# Patient Record
Sex: FEMALE | Race: WHITE | NOT HISPANIC OR LATINO | Employment: OTHER | URBAN - NONMETROPOLITAN AREA
[De-identification: names, ages, dates, MRNs, and addresses within clinical notes are randomized per-mention and may not be internally consistent; named-entity substitution may affect disease eponyms.]

---

## 2022-05-29 DIAGNOSIS — F51.01 PRIMARY INSOMNIA: Primary | ICD-10-CM

## 2022-05-31 RX ORDER — TRAZODONE HYDROCHLORIDE 50 MG/1
TABLET ORAL
Qty: 90 TABLET | Refills: 0 | Status: SHIPPED | OUTPATIENT
Start: 2022-05-31 | End: 2022-08-10 | Stop reason: SDUPTHER

## 2022-07-25 RX ORDER — AMLODIPINE BESYLATE 5 MG/1
TABLET ORAL
Qty: 90 TABLET | Refills: 0 | Status: SHIPPED | OUTPATIENT
Start: 2022-07-25 | End: 2022-08-10 | Stop reason: SDUPTHER

## 2022-07-25 NOTE — TELEPHONE ENCOUNTER
Rx Refill Note    Requested Prescriptions     Pending Prescriptions Disp Refills   • amLODIPine (NORVASC) 5 MG tablet [Pharmacy Med Name: amLODIPine BESYLATE 5 MG TAB] 90 tablet 0     Sig: TAKE ONE TABLET BY MOUTH DAILY        Last office visit with prescribing clinician: Via [facility] 12/07/2021      Next office visit with prescribing clinician: 8/10/2022   Last labs:   Last refill: 12/07/2021   Pharmacy (be sure to add in Epic). correct

## 2022-07-28 ENCOUNTER — LAB (OUTPATIENT)
Dept: FAMILY MEDICINE CLINIC | Facility: CLINIC | Age: 62
End: 2022-07-28

## 2022-07-28 DIAGNOSIS — Z79.899 ENCOUNTER FOR LONG-TERM (CURRENT) USE OF OTHER MEDICATIONS: Primary | ICD-10-CM

## 2022-07-28 PROCEDURE — 36415 COLL VENOUS BLD VENIPUNCTURE: CPT | Performed by: FAMILY MEDICINE

## 2022-07-29 LAB
BASOPHILS # BLD AUTO: 0 X10E3/UL (ref 0–0.2)
BASOPHILS NFR BLD AUTO: 1 %
CHOLEST SERPL-MCNC: 192 MG/DL (ref 100–199)
CK SERPL-CCNC: 76 U/L (ref 32–182)
EOSINOPHIL # BLD AUTO: 0.1 X10E3/UL (ref 0–0.4)
EOSINOPHIL NFR BLD AUTO: 3 %
ERYTHROCYTE [DISTWIDTH] IN BLOOD BY AUTOMATED COUNT: 12.7 % (ref 11.7–15.4)
HBA1C MFR BLD: 6.3 % (ref 4.8–5.6)
HCT VFR BLD AUTO: 41.7 % (ref 34–46.6)
HDLC SERPL-MCNC: 58 MG/DL
HGB BLD-MCNC: 13.3 G/DL (ref 11.1–15.9)
IMM GRANULOCYTES # BLD AUTO: 0 X10E3/UL (ref 0–0.1)
IMM GRANULOCYTES NFR BLD AUTO: 0 %
LDLC SERPL CALC-MCNC: 106 MG/DL (ref 0–99)
LYMPHOCYTES # BLD AUTO: 2.3 X10E3/UL (ref 0.7–3.1)
LYMPHOCYTES NFR BLD AUTO: 44 %
MCH RBC QN AUTO: 28 PG (ref 26.6–33)
MCHC RBC AUTO-ENTMCNC: 31.9 G/DL (ref 31.5–35.7)
MCV RBC AUTO: 88 FL (ref 79–97)
MONOCYTES # BLD AUTO: 0.4 X10E3/UL (ref 0.1–0.9)
MONOCYTES NFR BLD AUTO: 8 %
NEUTROPHILS # BLD AUTO: 2.3 X10E3/UL (ref 1.4–7)
NEUTROPHILS NFR BLD AUTO: 44 %
PLATELET # BLD AUTO: 239 X10E3/UL (ref 150–450)
RBC # BLD AUTO: 4.75 X10E6/UL (ref 3.77–5.28)
TRIGL SERPL-MCNC: 160 MG/DL (ref 0–149)
TSH SERPL DL<=0.005 MIU/L-ACNC: 4.74 UIU/ML (ref 0.45–4.5)
VLDLC SERPL CALC-MCNC: 28 MG/DL (ref 5–40)
WBC # BLD AUTO: 5.2 X10E3/UL (ref 3.4–10.8)

## 2022-08-10 ENCOUNTER — OFFICE VISIT (OUTPATIENT)
Dept: FAMILY MEDICINE CLINIC | Facility: CLINIC | Age: 62
End: 2022-08-10

## 2022-08-10 VITALS
HEART RATE: 76 BPM | OXYGEN SATURATION: 99 % | WEIGHT: 147.9 LBS | BODY MASS INDEX: 27.22 KG/M2 | TEMPERATURE: 97.6 F | HEIGHT: 62 IN | DIASTOLIC BLOOD PRESSURE: 80 MMHG | SYSTOLIC BLOOD PRESSURE: 120 MMHG | RESPIRATION RATE: 14 BRPM

## 2022-08-10 DIAGNOSIS — F51.01 PRIMARY INSOMNIA: ICD-10-CM

## 2022-08-10 DIAGNOSIS — E55.9 VITAMIN D DEFICIENCY: ICD-10-CM

## 2022-08-10 DIAGNOSIS — R73.9 HYPERGLYCEMIA: ICD-10-CM

## 2022-08-10 DIAGNOSIS — E78.2 HYPERLIPIDEMIA, MIXED: ICD-10-CM

## 2022-08-10 DIAGNOSIS — F41.9 ANXIETY: ICD-10-CM

## 2022-08-10 DIAGNOSIS — E03.9 ACQUIRED HYPOTHYROIDISM: ICD-10-CM

## 2022-08-10 DIAGNOSIS — E83.52 HYPERCALCEMIA: ICD-10-CM

## 2022-08-10 DIAGNOSIS — I10 HYPERTENSION, ESSENTIAL: ICD-10-CM

## 2022-08-10 DIAGNOSIS — Z00.00 PHYSICAL EXAM: Primary | ICD-10-CM

## 2022-08-10 DIAGNOSIS — Z13.9 SCREENING DUE: ICD-10-CM

## 2022-08-10 PROCEDURE — 99396 PREV VISIT EST AGE 40-64: CPT | Performed by: FAMILY MEDICINE

## 2022-08-10 RX ORDER — AMLODIPINE BESYLATE 5 MG/1
5 TABLET ORAL DAILY
Qty: 90 TABLET | Refills: 1 | Status: SHIPPED | OUTPATIENT
Start: 2022-08-10 | End: 2023-03-01 | Stop reason: SDUPTHER

## 2022-08-10 RX ORDER — LORAZEPAM 1 MG/1
1 TABLET ORAL EVERY 8 HOURS PRN
COMMUNITY
End: 2022-08-10 | Stop reason: SDUPTHER

## 2022-08-10 RX ORDER — OMEGA-3S/DHA/EPA/FISH OIL/D3 300MG-1000
400 CAPSULE ORAL DAILY
COMMUNITY
End: 2022-08-10 | Stop reason: SDUPTHER

## 2022-08-10 RX ORDER — OMEGA-3S/DHA/EPA/FISH OIL/D3 300MG-1000
400 CAPSULE ORAL DAILY
Qty: 90 TABLET | Refills: 1 | Status: SHIPPED | OUTPATIENT
Start: 2022-08-10 | End: 2023-02-20

## 2022-08-10 RX ORDER — SIMVASTATIN 20 MG
TABLET ORAL
COMMUNITY
Start: 2022-06-02 | End: 2022-08-10 | Stop reason: SDUPTHER

## 2022-08-10 RX ORDER — LORAZEPAM 1 MG/1
1 TABLET ORAL NIGHTLY
Qty: 30 TABLET | Refills: 2 | Status: SHIPPED | OUTPATIENT
Start: 2022-08-10 | End: 2023-03-01 | Stop reason: SDUPTHER

## 2022-08-10 RX ORDER — TRAZODONE HYDROCHLORIDE 50 MG/1
50 TABLET ORAL
Qty: 90 TABLET | Refills: 1 | Status: SHIPPED | OUTPATIENT
Start: 2022-08-10 | End: 2023-02-20

## 2022-08-10 RX ORDER — SIMVASTATIN 20 MG
20 TABLET ORAL NIGHTLY
Qty: 90 TABLET | Refills: 1 | Status: SHIPPED | OUTPATIENT
Start: 2022-08-10 | End: 2023-03-01 | Stop reason: SDUPTHER

## 2022-08-10 NOTE — PROGRESS NOTES
Patient Name: Julie B Sipple  : 1960   MRN: [id number]     Chief Complaint:    Chief Complaint   Patient presents with   • Annual Exam     Pt here for yearly exam   • Hypertension       History of Present Illness: Julie B Sipple is a 62 y.o. female who is here today for their annual health maintenance and physical.          Review of Systems:   Review of Systems   Constitutional: Negative.    HENT: Negative.    Eyes: Negative.    Respiratory: Negative.    Cardiovascular: Negative.    Gastrointestinal: Negative.    Neurological: Negative.        Past Medical History, Social History, Family History and Care Team were all reviewed with patient and updated as appropriate.     Medications:     Current Outpatient Medications:   •  amLODIPine (NORVASC) 5 MG tablet, Take 1 tablet by mouth Daily., Disp: 90 tablet, Rfl: 1  •  cholecalciferol (VITAMIN D3) 10 MCG (400 UNIT) tablet, Take 1 tablet by mouth Daily., Disp: 90 tablet, Rfl: 1  •  LORazepam (ATIVAN) 1 MG tablet, Take 1 tablet by mouth Every Night., Disp: 30 tablet, Rfl: 2  •  simvastatin (ZOCOR) 20 MG tablet, Take 1 tablet by mouth Every Night., Disp: 90 tablet, Rfl: 1  •  traZODone (DESYREL) 50 MG tablet, Take 1 tablet by mouth every night at bedtime., Disp: 90 tablet, Rfl: 1    Allergies:   No Known Allergies    Health Maintenance   Topic Date Due   • COLORECTAL CANCER SCREENING  Never done   • TDAP/TD VACCINES (2 - Td or Tdap) 10/12/2020   • HEPATITIS C SCREENING  Never done   • PAP SMEAR  Never done   • INFLUENZA VACCINE  10/01/2022   • LIPID PANEL  2023   • ANNUAL PHYSICAL  2023   • MAMMOGRAM  2024   • COVID-19 Vaccine  Completed   • ZOSTER VACCINE  Completed   • Pneumococcal Vaccine 0-64  Aged Out        PHQ-2 Total Score: 0       Intimate partner violence: (Screen on initial visit, pregnant women, women with injuries, older adult with injury or evidence of neglect):  • Violence can be a problem in many people's lives, so I now ask  "every patient about trauma or abuse they may have experienced in a relationship.  • Stress/Safety - Do you feel safe in your relationship?  • Afraid/Abused - Have you ever been in a relationship where you were threatened, hurt, or afraid?  • Friend/Family - Are your friends aware you have been hurt?  • Emergency Plan - Do you have a safe place to go and the resources you need in an emergency?    Osteoporosis:   • Ost menopausal women < 65 with RF (advancing age, previous fracture, glucocorticoid therapy, parental hip fracture, low body weight, current cigarette smoking, excessive alcohol consumption, rheumatoid arthritis, secondary osteoporosis [hypogonadism/premature menopause, malabsorption, chronic liver disease, IBD]).  • All women 65 or older      Physical Exam:  Vital Signs:   Vitals:    08/10/22 1028   BP: 120/80   BP Location: Left arm   Patient Position: Sitting   Cuff Size: Adult   Pulse: 76   Resp: 14   Temp: 97.6 °F (36.4 °C)   TempSrc: Temporal   SpO2: 99%   Weight: 67.1 kg (147 lb 14.4 oz)   Height: 157.5 cm (62\")   PainSc: 0-No pain     Body mass index is 27.05 kg/m².     Physical Exam  Vitals and nursing note reviewed.   Constitutional:       Appearance: Normal appearance. She is normal weight.   HENT:      Head: Normocephalic and atraumatic.      Right Ear: Tympanic membrane, ear canal and external ear normal.      Left Ear: Tympanic membrane, ear canal and external ear normal.      Nose: Nose normal.      Mouth/Throat:      Mouth: Mucous membranes are moist.      Pharynx: Oropharynx is clear.   Eyes:      Extraocular Movements: Extraocular movements intact.      Conjunctiva/sclera: Conjunctivae normal.      Pupils: Pupils are equal, round, and reactive to light.   Cardiovascular:      Rate and Rhythm: Normal rate and regular rhythm.      Pulses: Normal pulses.      Heart sounds: Normal heart sounds.   Pulmonary:      Effort: Pulmonary effort is normal.      Breath sounds: Normal breath sounds. "   Abdominal:      General: Abdomen is flat. Bowel sounds are normal.      Palpations: Abdomen is soft.   Musculoskeletal:         General: Normal range of motion.      Cervical back: Normal range of motion and neck supple.   Feet:      Comments:      Skin:     General: Skin is warm and dry.   Neurological:      General: No focal deficit present.      Mental Status: She is alert and oriented to person, place, and time.   Psychiatric:         Mood and Affect: Mood normal.         Behavior: Behavior normal.         Thought Content: Thought content normal.         Judgment: Judgment normal.         Procedures      Assessment/Plan:   Diagnoses and all orders for this visit:    1. Physical exam (Primary)  Assessment & Plan:  Discussed immunizations with patient.    Orders:  -     Hemoglobin A1c; Future  -     Vitamin B12; Future  -     Vitamin D 25 Hydroxy; Future  -     Lipid Panel; Future  -     Comprehensive Metabolic Panel; Future  -     TSH Rfx On Abnormal To Free T4; Future  -     CBC & Differential; Future    2. Hyperglycemia  Assessment & Plan:  A1c is 6.3.    Orders:  -     Hemoglobin A1c; Future  -     Vitamin B12; Future  -     Vitamin D 25 Hydroxy; Future  -     Lipid Panel; Future  -     Comprehensive Metabolic Panel; Future  -     TSH Rfx On Abnormal To Free T4; Future  -     CBC & Differential; Future    3. Hypercalcemia  Assessment & Plan:  Patient sees endocrinologist.  We will follow.    Orders:  -     Hemoglobin A1c; Future  -     Vitamin B12; Future  -     Vitamin D 25 Hydroxy; Future  -     Lipid Panel; Future  -     Comprehensive Metabolic Panel; Future  -     TSH Rfx On Abnormal To Free T4; Future  -     CBC & Differential; Future    4. Hypertension, essential  Assessment & Plan:  Discussed with patient to monitor their blood pressure and if systolic blood pressure goes above 140 or diastolic is above 90 to return to clinic.  Take medicines as directed, call for any problems, patient not having or  any worrisome symptoms.        Orders:  -     amLODIPine (NORVASC) 5 MG tablet; Take 1 tablet by mouth Daily.  Dispense: 90 tablet; Refill: 1  -     Hemoglobin A1c; Future  -     Vitamin B12; Future  -     Vitamin D 25 Hydroxy; Future  -     Lipid Panel; Future  -     Comprehensive Metabolic Panel; Future  -     TSH Rfx On Abnormal To Free T4; Future  -     CBC & Differential; Future    5. Hyperlipidemia, mixed  Assessment & Plan:  HDL 58.  .  Triglycerides 160.  We will follow.    Orders:  -     simvastatin (ZOCOR) 20 MG tablet; Take 1 tablet by mouth Every Night.  Dispense: 90 tablet; Refill: 1  -     Hemoglobin A1c; Future  -     Vitamin B12; Future  -     Vitamin D 25 Hydroxy; Future  -     Lipid Panel; Future  -     Comprehensive Metabolic Panel; Future  -     TSH Rfx On Abnormal To Free T4; Future  -     CBC & Differential; Future    6. Acquired hypothyroidism  Assessment & Plan:  TSH is 4.7.  The patient about this she has no symptoms whatsoever.  Recheck in to recheck this in 6 months.  I told her if she starts getting rundown tired, I can we may start a low-dose thyroid replacement.    Orders:  -     Hemoglobin A1c; Future  -     Vitamin B12; Future  -     Vitamin D 25 Hydroxy; Future  -     Lipid Panel; Future  -     Comprehensive Metabolic Panel; Future  -     TSH Rfx On Abnormal To Free T4; Future  -     CBC & Differential; Future    7. Screening due  -     Hepatitis C Antibody; Future  -     Hemoglobin A1c; Future  -     Vitamin B12; Future  -     Vitamin D 25 Hydroxy; Future  -     Lipid Panel; Future  -     Comprehensive Metabolic Panel; Future  -     TSH Rfx On Abnormal To Free T4; Future  -     CBC & Differential; Future    8. Primary insomnia  -     traZODone (DESYREL) 50 MG tablet; Take 1 tablet by mouth every night at bedtime.  Dispense: 90 tablet; Refill: 1  -     LORazepam (ATIVAN) 1 MG tablet; Take 1 tablet by mouth Every Night.  Dispense: 30 tablet; Refill: 2  -     Hemoglobin A1c;  Future  -     Vitamin B12; Future  -     Vitamin D 25 Hydroxy; Future  -     Lipid Panel; Future  -     Comprehensive Metabolic Panel; Future  -     TSH Rfx On Abnormal To Free T4; Future  -     CBC & Differential; Future    9. Vitamin D deficiency  -     cholecalciferol (VITAMIN D3) 10 MCG (400 UNIT) tablet; Take 1 tablet by mouth Daily.  Dispense: 90 tablet; Refill: 1  -     Hemoglobin A1c; Future  -     Vitamin B12; Future  -     Vitamin D 25 Hydroxy; Future  -     Lipid Panel; Future  -     Comprehensive Metabolic Panel; Future  -     TSH Rfx On Abnormal To Free T4; Future  -     CBC & Differential; Future    10. Anxiety  Assessment & Plan:  Fill Ativan.  House bill 1.    Orders:  -     LORazepam (ATIVAN) 1 MG tablet; Take 1 tablet by mouth Every Night.  Dispense: 30 tablet; Refill: 2  -     Hemoglobin A1c; Future  -     Vitamin B12; Future  -     Vitamin D 25 Hydroxy; Future  -     Lipid Panel; Future  -     Comprehensive Metabolic Panel; Future  -     TSH Rfx On Abnormal To Free T4; Future  -     CBC & Differential; Future           Follow Up:   Return in about 6 months (around 2/10/2023) for Bloodwork 1 week prior to next appointment.    Healthcare Maintenance:   Counseling provided on diet and exercise  Julie B Sipple voices understanding and acceptance of this advice and will call back with any further questions or concerns. AVS with preventive healthcare tips printed for patient.     Janak Leiva MD  [facility]  Answers for HPI/ROS submitted by the patient on 8/3/2022  What is the primary reason for your visit?: Physical

## 2022-08-10 NOTE — ASSESSMENT & PLAN NOTE
TSH is 4.7.  The patient about this she has no symptoms whatsoever.  Recheck in to recheck this in 6 months.  I told her if she starts getting rundown tired, I can we may start a low-dose thyroid replacement.

## 2023-02-18 DIAGNOSIS — E55.9 VITAMIN D DEFICIENCY: ICD-10-CM

## 2023-02-20 DIAGNOSIS — F51.01 PRIMARY INSOMNIA: ICD-10-CM

## 2023-02-20 RX ORDER — OMEGA-3S/DHA/EPA/FISH OIL/D3 300MG-1000
CAPSULE ORAL
Qty: 90 TABLET | Refills: 1 | Status: SHIPPED | OUTPATIENT
Start: 2023-02-20 | End: 2023-03-01 | Stop reason: SDUPTHER

## 2023-02-20 RX ORDER — TRAZODONE HYDROCHLORIDE 50 MG/1
TABLET ORAL
Qty: 90 TABLET | Refills: 1 | Status: SHIPPED | OUTPATIENT
Start: 2023-02-20 | End: 2023-03-01 | Stop reason: SDUPTHER

## 2023-02-20 NOTE — TELEPHONE ENCOUNTER
Rx Refill Note    Requested Prescriptions     Pending Prescriptions Disp Refills   • traZODone (DESYREL) 50 MG tablet [Pharmacy Med Name: traZODone 50 MG TABLET] 90 tablet 1     Sig: TAKE ONE TABLET BY MOUTH EVERY NIGHT AT BEDTIME        Last office visit with prescribing clinician: 8/10/2022      Next office visit with prescribing clinician: Visit date not found   Last labs:   Last refill:    Pharmacy [facility]

## 2023-02-20 NOTE — TELEPHONE ENCOUNTER
Rx Refill Note    Requested Prescriptions     Pending Prescriptions Disp Refills   • cholecalciferol (VITAMIN D3) 10 MCG (400 UNIT) tablet [Pharmacy Med Name: VITAMIN D3 (CHOLECAL) 400 UNIT TAB] 90 tablet 1     Sig: TAKE ONE TABLET BY MOUTH DAILY        Last office visit with prescribing clinician: 8/10/2022      Next office visit with prescribing clinician: 2/20/2023   Last labs:   Last refill:    Pharmacy [facility]

## 2023-02-24 ENCOUNTER — LAB (OUTPATIENT)
Dept: FAMILY MEDICINE CLINIC | Facility: CLINIC | Age: 63
End: 2023-02-24
Payer: MEDICARE

## 2023-02-24 DIAGNOSIS — E83.52 HYPERCALCEMIA: ICD-10-CM

## 2023-02-24 DIAGNOSIS — E78.2 HYPERLIPIDEMIA, MIXED: ICD-10-CM

## 2023-02-24 DIAGNOSIS — F41.9 ANXIETY: ICD-10-CM

## 2023-02-24 DIAGNOSIS — I10 HYPERTENSION, ESSENTIAL: ICD-10-CM

## 2023-02-24 DIAGNOSIS — F51.01 PRIMARY INSOMNIA: ICD-10-CM

## 2023-02-24 DIAGNOSIS — E55.9 VITAMIN D DEFICIENCY: ICD-10-CM

## 2023-02-24 DIAGNOSIS — Z13.9 SCREENING DUE: ICD-10-CM

## 2023-02-24 DIAGNOSIS — R73.9 HYPERGLYCEMIA: ICD-10-CM

## 2023-02-24 DIAGNOSIS — E03.9 ACQUIRED HYPOTHYROIDISM: ICD-10-CM

## 2023-02-24 DIAGNOSIS — Z00.00 PHYSICAL EXAM: ICD-10-CM

## 2023-02-24 PROCEDURE — 36415 COLL VENOUS BLD VENIPUNCTURE: CPT | Performed by: FAMILY MEDICINE

## 2023-02-25 LAB
ALBUMIN SERPL-MCNC: 4.6 G/DL (ref 3.8–4.8)
ALBUMIN/GLOB SERPL: 2.6 {RATIO} (ref 1.2–2.2)
ALP SERPL-CCNC: 92 IU/L (ref 44–121)
ALT SERPL-CCNC: 34 IU/L (ref 0–32)
AST SERPL-CCNC: 30 IU/L (ref 0–40)
BASOPHILS # BLD AUTO: 0 X10E3/UL (ref 0–0.2)
BASOPHILS NFR BLD AUTO: 1 %
BILIRUB SERPL-MCNC: 0.5 MG/DL (ref 0–1.2)
BUN SERPL-MCNC: 15 MG/DL (ref 8–27)
BUN/CREAT SERPL: 21 (ref 12–28)
CALCIUM SERPL-MCNC: 10.3 MG/DL (ref 8.7–10.3)
CHLORIDE SERPL-SCNC: 104 MMOL/L (ref 96–106)
CHOLEST SERPL-MCNC: 169 MG/DL (ref 100–199)
CO2 SERPL-SCNC: 23 MMOL/L (ref 20–29)
CREAT SERPL-MCNC: 0.7 MG/DL (ref 0.57–1)
EGFRCR SERPLBLD CKD-EPI 2021: 96 ML/MIN/1.73
EOSINOPHIL # BLD AUTO: 0.2 X10E3/UL (ref 0–0.4)
EOSINOPHIL NFR BLD AUTO: 3 %
ERYTHROCYTE [DISTWIDTH] IN BLOOD BY AUTOMATED COUNT: 12.5 % (ref 11.7–15.4)
GLOBULIN SER CALC-MCNC: 1.8 G/DL (ref 1.5–4.5)
GLUCOSE SERPL-MCNC: 109 MG/DL (ref 70–99)
HBA1C MFR BLD: 6.2 % (ref 4.8–5.6)
HCT VFR BLD AUTO: 39.9 % (ref 34–46.6)
HDLC SERPL-MCNC: 58 MG/DL
HGB BLD-MCNC: 13.2 G/DL (ref 11.1–15.9)
IMM GRANULOCYTES # BLD AUTO: 0 X10E3/UL (ref 0–0.1)
IMM GRANULOCYTES NFR BLD AUTO: 0 %
LDLC SERPL CALC-MCNC: 91 MG/DL (ref 0–99)
LYMPHOCYTES # BLD AUTO: 2.4 X10E3/UL (ref 0.7–3.1)
LYMPHOCYTES NFR BLD AUTO: 41 %
MCH RBC QN AUTO: 28.4 PG (ref 26.6–33)
MCHC RBC AUTO-ENTMCNC: 33.1 G/DL (ref 31.5–35.7)
MCV RBC AUTO: 86 FL (ref 79–97)
MONOCYTES # BLD AUTO: 0.4 X10E3/UL (ref 0.1–0.9)
MONOCYTES NFR BLD AUTO: 7 %
NEUTROPHILS # BLD AUTO: 2.8 X10E3/UL (ref 1.4–7)
NEUTROPHILS NFR BLD AUTO: 48 %
PLATELET # BLD AUTO: 236 X10E3/UL (ref 150–450)
POTASSIUM SERPL-SCNC: 3.9 MMOL/L (ref 3.5–5.2)
PROT SERPL-MCNC: 6.4 G/DL (ref 6–8.5)
RBC # BLD AUTO: 4.64 X10E6/UL (ref 3.77–5.28)
SODIUM SERPL-SCNC: 141 MMOL/L (ref 134–144)
TRIGL SERPL-MCNC: 115 MG/DL (ref 0–149)
VLDLC SERPL CALC-MCNC: 20 MG/DL (ref 5–40)
WBC # BLD AUTO: 5.7 X10E3/UL (ref 3.4–10.8)

## 2023-03-01 ENCOUNTER — OFFICE VISIT (OUTPATIENT)
Dept: FAMILY MEDICINE CLINIC | Facility: CLINIC | Age: 63
End: 2023-03-01
Payer: MEDICARE

## 2023-03-01 VITALS
RESPIRATION RATE: 15 BRPM | TEMPERATURE: 98 F | BODY MASS INDEX: 26.78 KG/M2 | HEIGHT: 62 IN | WEIGHT: 145.5 LBS | SYSTOLIC BLOOD PRESSURE: 130 MMHG | OXYGEN SATURATION: 99 % | HEART RATE: 77 BPM | DIASTOLIC BLOOD PRESSURE: 82 MMHG

## 2023-03-01 DIAGNOSIS — R73.9 HYPERGLYCEMIA: ICD-10-CM

## 2023-03-01 DIAGNOSIS — E83.52 HYPERCALCEMIA: ICD-10-CM

## 2023-03-01 DIAGNOSIS — Z79.899 ENCOUNTER FOR LONG-TERM (CURRENT) USE OF OTHER MEDICATIONS: Primary | ICD-10-CM

## 2023-03-01 DIAGNOSIS — E78.2 HYPERLIPIDEMIA, MIXED: ICD-10-CM

## 2023-03-01 DIAGNOSIS — E03.9 ACQUIRED HYPOTHYROIDISM: ICD-10-CM

## 2023-03-01 DIAGNOSIS — F41.9 ANXIETY: ICD-10-CM

## 2023-03-01 DIAGNOSIS — I10 HYPERTENSION, ESSENTIAL: ICD-10-CM

## 2023-03-01 DIAGNOSIS — F51.01 PRIMARY INSOMNIA: ICD-10-CM

## 2023-03-01 DIAGNOSIS — E55.9 VITAMIN D DEFICIENCY: ICD-10-CM

## 2023-03-01 PROBLEM — R73.03 PREDIABETES: Status: ACTIVE | Noted: 2017-05-15

## 2023-03-01 PROBLEM — F40.243 FEAR OF FLYING: Status: ACTIVE | Noted: 2017-05-15

## 2023-03-01 LAB
25(OH)D3+25(OH)D2 SERPL-MCNC: 58.6 NG/ML (ref 30–100)
HCV IGG SERPL QL IA: NON REACTIVE
POC AMPHETAMINES: NEGATIVE
POC BARBITURATES: NEGATIVE
POC BENZODIAZEPHINES: POSITIVE
POC COCAINE: NEGATIVE
POC METHADONE: NEGATIVE
POC METHAMPHETAMINE SCREEN URINE: NEGATIVE
POC OPIATES: NEGATIVE
POC OXYCODONE: NEGATIVE
POC PHENCYCLIDINE: NEGATIVE
POC PROPOXYPHENE: NEGATIVE
POC THC: NEGATIVE
POC TRICYCLIC ANTIDEPRESSANTS: NEGATIVE
TSH SERPL DL<=0.005 MIU/L-ACNC: 2.68 UIU/ML (ref 0.45–4.5)

## 2023-03-01 PROCEDURE — 36415 COLL VENOUS BLD VENIPUNCTURE: CPT | Performed by: FAMILY MEDICINE

## 2023-03-01 PROCEDURE — 90677 PCV20 VACCINE IM: CPT | Performed by: FAMILY MEDICINE

## 2023-03-01 PROCEDURE — 80305 DRUG TEST PRSMV DIR OPT OBS: CPT | Performed by: FAMILY MEDICINE

## 2023-03-01 PROCEDURE — 90715 TDAP VACCINE 7 YRS/> IM: CPT | Performed by: FAMILY MEDICINE

## 2023-03-01 PROCEDURE — 90471 IMMUNIZATION ADMIN: CPT | Performed by: FAMILY MEDICINE

## 2023-03-01 PROCEDURE — G0009 ADMIN PNEUMOCOCCAL VACCINE: HCPCS | Performed by: FAMILY MEDICINE

## 2023-03-01 PROCEDURE — 99214 OFFICE O/P EST MOD 30 MIN: CPT | Performed by: FAMILY MEDICINE

## 2023-03-01 RX ORDER — OMEGA-3S/DHA/EPA/FISH OIL/D3 300MG-1000
400 CAPSULE ORAL DAILY
Qty: 90 TABLET | Refills: 1 | Status: SHIPPED | OUTPATIENT
Start: 2023-03-01

## 2023-03-01 RX ORDER — LORAZEPAM 1 MG/1
1 TABLET ORAL NIGHTLY
Qty: 30 TABLET | Refills: 2 | Status: SHIPPED | OUTPATIENT
Start: 2023-03-01

## 2023-03-01 RX ORDER — TRAZODONE HYDROCHLORIDE 50 MG/1
50 TABLET ORAL
Qty: 90 TABLET | Refills: 1 | Status: SHIPPED | OUTPATIENT
Start: 2023-03-01

## 2023-03-01 RX ORDER — SIMVASTATIN 20 MG
20 TABLET ORAL NIGHTLY
Qty: 90 TABLET | Refills: 1 | Status: SHIPPED | OUTPATIENT
Start: 2023-03-01

## 2023-03-01 RX ORDER — AMLODIPINE BESYLATE 5 MG/1
5 TABLET ORAL DAILY
Qty: 90 TABLET | Refills: 1 | Status: SHIPPED | OUTPATIENT
Start: 2023-03-01

## 2023-03-01 NOTE — PROGRESS NOTES
Patient Name: Julie B Sipple  : 1960   MRN: [id number]     Chief Complaint:    Chief Complaint   Patient presents with   • Med Refill     Patient is here for a meds refills        History of Present Illness: Julie B Sipple is a 63 y.o. female who is here today for follow up.  HPI        Review of Systems:   Review of Systems   Constitutional: Negative.    HENT: Negative.    Eyes: Negative.    Respiratory: Negative.    Cardiovascular: Negative.    Gastrointestinal: Negative.    Neurological: Negative.         Past Medical History:   Past Medical History:   Diagnosis Date   • Anxiety    • Fear of flying    • Mixed hyperlipidemia    • Prediabetes        Past Surgical History:   Past Surgical History:   Procedure Laterality Date   • TUBAL ABDOMINAL LIGATION Bilateral        Family History:   Family History   Problem Relation Age of Onset   • Hypertension Mother    • Breast cancer Mother 42   • Hypertension Sister    • Stroke Maternal Grandmother 65   • Other Daughter         BLADDER DISEASE       Social History:   Social History     Socioeconomic History   • Marital status:    Tobacco Use   • Smoking status: Former     Packs/day: 1.00     Years: 4.00     Pack years: 4.00     Types: Cigarettes     Start date:      Quit date:      Years since quittin.1   • Smokeless tobacco: Never   Substance and Sexual Activity   • Alcohol use: Never   • Drug use: Never   • Sexual activity: Defer       Medications:     Current Outpatient Medications:   •  amLODIPine (NORVASC) 5 MG tablet, Take 1 tablet by mouth Daily., Disp: 90 tablet, Rfl: 1  •  cholecalciferol (VITAMIN D3) 10 MCG (400 UNIT) tablet, Take 1 tablet by mouth Daily., Disp: 90 tablet, Rfl: 1  •  LORazepam (ATIVAN) 1 MG tablet, Take 1 tablet by mouth Every Night., Disp: 30 tablet, Rfl: 2  •  simvastatin (ZOCOR) 20 MG tablet, Take 1 tablet by mouth Every Night., Disp: 90 tablet, Rfl: 1  •  traZODone (DESYREL) 50 MG tablet, Take 1 tablet by  "mouth every night at bedtime., Disp: 90 tablet, Rfl: 1    Allergies:   No Known Allergies      Physical Exam:  Vital Signs:   Vitals:    03/01/23 0922   BP: 130/82   BP Location: Right arm   Patient Position: Sitting   Cuff Size: Adult   Pulse: 77   Resp: 15   Temp: 98 °F (36.7 °C)   TempSrc: Temporal   SpO2: 99%   Weight: 66 kg (145 lb 8 oz)   Height: 157.5 cm (62\")     Body mass index is 26.61 kg/m².     Physical Exam  Vitals and nursing note reviewed.   Constitutional:       Appearance: Normal appearance. She is normal weight.   HENT:      Head: Normocephalic and atraumatic.      Right Ear: Tympanic membrane, ear canal and external ear normal.      Left Ear: Tympanic membrane, ear canal and external ear normal.      Nose: Nose normal.      Mouth/Throat:      Mouth: Mucous membranes are dry.      Pharynx: Oropharynx is clear.   Eyes:      Extraocular Movements: Extraocular movements intact.      Conjunctiva/sclera: Conjunctivae normal.      Pupils: Pupils are equal, round, and reactive to light.   Cardiovascular:      Rate and Rhythm: Normal rate and regular rhythm.      Pulses: Normal pulses.      Heart sounds: Normal heart sounds.   Pulmonary:      Effort: Pulmonary effort is normal.      Breath sounds: Normal breath sounds.   Musculoskeletal:      Cervical back: Normal range of motion and neck supple.   Feet:      Comments:      Neurological:      Mental Status: She is alert.         Procedures      Assessment/Plan:   Diagnoses and all orders for this visit:    1. Encounter for long-term (current) use of other medications (Primary)  -     POC Urine Drug Screen, Triage  -     Hemoglobin A1c; Future  -     Vitamin D,25-Hydroxy; Future  -     Lipid Panel; Future  -     Comprehensive Metabolic Panel; Future  -     TSH Rfx On Abnormal To Free T4; Future  -     CBC & Differential; Future  -     TSH Rfx On Abnormal To Free T4; Future  -     Vitamin D,25-Hydroxy; Future    2. Hypertension, essential  Assessment & " Plan:  Discussed with patient to monitor their blood pressure and if systolic blood pressure goes above 140 or diastolic is above 90 to return to clinic.  Take medicines as directed, call for any problems, patient not having or any worrisome symptoms.        Orders:  -     amLODIPine (NORVASC) 5 MG tablet; Take 1 tablet by mouth Daily.  Dispense: 90 tablet; Refill: 1  -     Hemoglobin A1c; Future  -     Vitamin D,25-Hydroxy; Future  -     Lipid Panel; Future  -     Comprehensive Metabolic Panel; Future  -     TSH Rfx On Abnormal To Free T4; Future  -     CBC & Differential; Future  -     TSH Rfx On Abnormal To Free T4; Future  -     Vitamin D,25-Hydroxy; Future    3. Vitamin D deficiency  Assessment & Plan:  Vitamin D today.    Orders:  -     cholecalciferol (VITAMIN D3) 10 MCG (400 UNIT) tablet; Take 1 tablet by mouth Daily.  Dispense: 90 tablet; Refill: 1  -     Hemoglobin A1c; Future  -     Vitamin D,25-Hydroxy; Future  -     Lipid Panel; Future  -     Comprehensive Metabolic Panel; Future  -     TSH Rfx On Abnormal To Free T4; Future  -     CBC & Differential; Future  -     TSH Rfx On Abnormal To Free T4; Future  -     Vitamin D,25-Hydroxy; Future    4. Primary insomnia  -     LORazepam (ATIVAN) 1 MG tablet; Take 1 tablet by mouth Every Night.  Dispense: 30 tablet; Refill: 2  -     traZODone (DESYREL) 50 MG tablet; Take 1 tablet by mouth every night at bedtime.  Dispense: 90 tablet; Refill: 1  -     Hemoglobin A1c; Future  -     Vitamin D,25-Hydroxy; Future  -     Lipid Panel; Future  -     Comprehensive Metabolic Panel; Future  -     TSH Rfx On Abnormal To Free T4; Future  -     CBC & Differential; Future  -     TSH Rfx On Abnormal To Free T4; Future  -     Vitamin D,25-Hydroxy; Future    5. Anxiety  Assessment & Plan:  Refill Ativan.    Orders:  -     LORazepam (ATIVAN) 1 MG tablet; Take 1 tablet by mouth Every Night.  Dispense: 30 tablet; Refill: 2  -     Hemoglobin A1c; Future  -     Vitamin D,25-Hydroxy;  Future  -     Lipid Panel; Future  -     Comprehensive Metabolic Panel; Future  -     TSH Rfx On Abnormal To Free T4; Future  -     CBC & Differential; Future  -     TSH Rfx On Abnormal To Free T4; Future  -     Vitamin D,25-Hydroxy; Future    6. Hyperlipidemia, mixed  Assessment & Plan:  HDL 58.  LDL 91.  Triglycerides 115.  Recheck in 6 months.    Orders:  -     simvastatin (ZOCOR) 20 MG tablet; Take 1 tablet by mouth Every Night.  Dispense: 90 tablet; Refill: 1  -     Hemoglobin A1c; Future  -     Vitamin D,25-Hydroxy; Future  -     Lipid Panel; Future  -     Comprehensive Metabolic Panel; Future  -     TSH Rfx On Abnormal To Free T4; Future  -     CBC & Differential; Future  -     TSH Rfx On Abnormal To Free T4; Future  -     Vitamin D,25-Hydroxy; Future    7. Acquired hypothyroidism  Assessment & Plan:  TSH today and in 6 months.    Orders:  -     Hemoglobin A1c; Future  -     Vitamin D,25-Hydroxy; Future  -     Lipid Panel; Future  -     Comprehensive Metabolic Panel; Future  -     TSH Rfx On Abnormal To Free T4; Future  -     CBC & Differential; Future  -     TSH Rfx On Abnormal To Free T4; Future  -     Vitamin D,25-Hydroxy; Future    8. Hypercalcemia  Assessment & Plan:  Calcium is normal.  Patient saw endocrinologist and do not think she needs to be seen again unless her calcium comes back up.    Orders:  -     Hemoglobin A1c; Future  -     Vitamin D,25-Hydroxy; Future  -     Lipid Panel; Future  -     Comprehensive Metabolic Panel; Future  -     TSH Rfx On Abnormal To Free T4; Future  -     CBC & Differential; Future  -     TSH Rfx On Abnormal To Free T4; Future  -     Vitamin D,25-Hydroxy; Future    9. Hyperglycemia  Assessment & Plan:  1C better at 6.2.  She has been walking more.  Recheck in 6 months.    Orders:  -     Hemoglobin A1c; Future  -     Vitamin D,25-Hydroxy; Future  -     Lipid Panel; Future  -     Comprehensive Metabolic Panel; Future  -     TSH Rfx On Abnormal To Free T4; Future  -      CBC & Differential; Future  -     TSH Rfx On Abnormal To Free T4; Future  -     Vitamin D,25-Hydroxy; Future    Other orders  -     Tdap Vaccine Greater Than or Equal To 8yo IM  -     Pneumococcal Conjugate Vaccine 20-Valent (PCV20)           Follow Up:   Return in about 6 months (around 9/1/2023) for Annual physical, Bloodwork 1 week prior to next appointment.    Janak Leiva MD  [facility] [place] [facility]

## 2023-03-01 NOTE — ASSESSMENT & PLAN NOTE
Calcium is normal.  Patient saw endocrinologist and do not think she needs to be seen again unless her calcium comes back up.

## 2023-03-02 LAB
25(OH)D3+25(OH)D2 SERPL-MCNC: 57.8 NG/ML (ref 30–100)
TSH SERPL DL<=0.005 MIU/L-ACNC: 3.5 UIU/ML (ref 0.45–4.5)

## 2023-08-18 DIAGNOSIS — R92.8 ABNORMAL MAMMOGRAM: ICD-10-CM

## 2023-09-07 ENCOUNTER — LAB (OUTPATIENT)
Dept: FAMILY MEDICINE CLINIC | Facility: CLINIC | Age: 63
End: 2023-09-07
Payer: MEDICARE

## 2023-09-07 DIAGNOSIS — R73.9 HYPERGLYCEMIA: ICD-10-CM

## 2023-09-07 DIAGNOSIS — E83.52 HYPERCALCEMIA: ICD-10-CM

## 2023-09-07 DIAGNOSIS — E78.2 HYPERLIPIDEMIA, MIXED: ICD-10-CM

## 2023-09-07 DIAGNOSIS — Z79.899 ENCOUNTER FOR LONG-TERM (CURRENT) USE OF OTHER MEDICATIONS: ICD-10-CM

## 2023-09-07 DIAGNOSIS — E03.9 ACQUIRED HYPOTHYROIDISM: ICD-10-CM

## 2023-09-07 DIAGNOSIS — I10 HYPERTENSION, ESSENTIAL: ICD-10-CM

## 2023-09-07 DIAGNOSIS — E55.9 VITAMIN D DEFICIENCY: ICD-10-CM

## 2023-09-07 DIAGNOSIS — F51.01 PRIMARY INSOMNIA: ICD-10-CM

## 2023-09-07 DIAGNOSIS — F41.9 ANXIETY: ICD-10-CM

## 2023-09-07 PROCEDURE — 36415 COLL VENOUS BLD VENIPUNCTURE: CPT | Performed by: FAMILY MEDICINE

## 2023-09-08 LAB
25(OH)D3+25(OH)D2 SERPL-MCNC: 93.4 NG/ML (ref 30–100)
ALBUMIN SERPL-MCNC: 4.7 G/DL (ref 3.9–4.9)
ALBUMIN/GLOB SERPL: 2.5 {RATIO} (ref 1.2–2.2)
ALP SERPL-CCNC: 81 IU/L (ref 44–121)
ALT SERPL-CCNC: 22 IU/L (ref 0–32)
AST SERPL-CCNC: 20 IU/L (ref 0–40)
BASOPHILS # BLD AUTO: 0 X10E3/UL (ref 0–0.2)
BASOPHILS NFR BLD AUTO: 1 %
BILIRUB SERPL-MCNC: 0.3 MG/DL (ref 0–1.2)
BUN SERPL-MCNC: 19 MG/DL (ref 8–27)
BUN/CREAT SERPL: 23 (ref 12–28)
CALCIUM SERPL-MCNC: 10.5 MG/DL (ref 8.7–10.3)
CHLORIDE SERPL-SCNC: 107 MMOL/L (ref 96–106)
CHOLEST SERPL-MCNC: 167 MG/DL (ref 100–199)
CO2 SERPL-SCNC: 22 MMOL/L (ref 20–29)
CREAT SERPL-MCNC: 0.81 MG/DL (ref 0.57–1)
EGFRCR SERPLBLD CKD-EPI 2021: 81 ML/MIN/1.73
EOSINOPHIL # BLD AUTO: 0.1 X10E3/UL (ref 0–0.4)
EOSINOPHIL NFR BLD AUTO: 2 %
ERYTHROCYTE [DISTWIDTH] IN BLOOD BY AUTOMATED COUNT: 12.4 % (ref 11.7–15.4)
GLOBULIN SER CALC-MCNC: 1.9 G/DL (ref 1.5–4.5)
GLUCOSE SERPL-MCNC: 104 MG/DL (ref 70–99)
HBA1C MFR BLD: 6.1 % (ref 4.8–5.6)
HCT VFR BLD AUTO: 42.4 % (ref 34–46.6)
HDLC SERPL-MCNC: 61 MG/DL
HGB BLD-MCNC: 13.7 G/DL (ref 11.1–15.9)
IMM GRANULOCYTES # BLD AUTO: 0 X10E3/UL (ref 0–0.1)
IMM GRANULOCYTES NFR BLD AUTO: 0 %
LDLC SERPL CALC-MCNC: 90 MG/DL (ref 0–99)
LYMPHOCYTES # BLD AUTO: 2.4 X10E3/UL (ref 0.7–3.1)
LYMPHOCYTES NFR BLD AUTO: 43 %
MCH RBC QN AUTO: 28.4 PG (ref 26.6–33)
MCHC RBC AUTO-ENTMCNC: 32.3 G/DL (ref 31.5–35.7)
MCV RBC AUTO: 88 FL (ref 79–97)
MONOCYTES # BLD AUTO: 0.5 X10E3/UL (ref 0.1–0.9)
MONOCYTES NFR BLD AUTO: 8 %
NEUTROPHILS # BLD AUTO: 2.6 X10E3/UL (ref 1.4–7)
NEUTROPHILS NFR BLD AUTO: 46 %
PLATELET # BLD AUTO: 228 X10E3/UL (ref 150–450)
POTASSIUM SERPL-SCNC: 4 MMOL/L (ref 3.5–5.2)
PROT SERPL-MCNC: 6.6 G/DL (ref 6–8.5)
RBC # BLD AUTO: 4.83 X10E6/UL (ref 3.77–5.28)
SODIUM SERPL-SCNC: 145 MMOL/L (ref 134–144)
T4 FREE SERPL-MCNC: 1.23 NG/DL (ref 0.82–1.77)
TRIGL SERPL-MCNC: 87 MG/DL (ref 0–149)
TSH SERPL DL<=0.005 MIU/L-ACNC: 5.29 UIU/ML (ref 0.45–4.5)
VLDLC SERPL CALC-MCNC: 16 MG/DL (ref 5–40)
WBC # BLD AUTO: 5.7 X10E3/UL (ref 3.4–10.8)

## 2023-09-14 ENCOUNTER — OFFICE VISIT (OUTPATIENT)
Dept: FAMILY MEDICINE CLINIC | Facility: CLINIC | Age: 63
End: 2023-09-14
Payer: MEDICARE

## 2023-09-14 VITALS
SYSTOLIC BLOOD PRESSURE: 110 MMHG | WEIGHT: 146.5 LBS | HEIGHT: 62 IN | RESPIRATION RATE: 16 BRPM | OXYGEN SATURATION: 97 % | DIASTOLIC BLOOD PRESSURE: 68 MMHG | HEART RATE: 84 BPM | BODY MASS INDEX: 26.96 KG/M2

## 2023-09-14 DIAGNOSIS — E66.01 MORBID (SEVERE) OBESITY DUE TO EXCESS CALORIES: ICD-10-CM

## 2023-09-14 DIAGNOSIS — I10 HYPERTENSION, ESSENTIAL: ICD-10-CM

## 2023-09-14 DIAGNOSIS — E83.52 HYPERCALCEMIA: ICD-10-CM

## 2023-09-14 DIAGNOSIS — Z09 ENCOUNTER FOR FOLLOW-UP EXAMINATION AFTER COMPLETED TREATMENT FOR CONDITIONS OTHER THAN MALIGNANT NEOPLASM: ICD-10-CM

## 2023-09-14 DIAGNOSIS — F41.9 ANXIETY: ICD-10-CM

## 2023-09-14 DIAGNOSIS — Z00.00 WELCOME TO MEDICARE PREVENTIVE VISIT: Primary | ICD-10-CM

## 2023-09-14 DIAGNOSIS — R73.9 HYPERGLYCEMIA: ICD-10-CM

## 2023-09-14 DIAGNOSIS — E55.9 VITAMIN D DEFICIENCY: ICD-10-CM

## 2023-09-14 DIAGNOSIS — F51.01 PRIMARY INSOMNIA: ICD-10-CM

## 2023-09-14 DIAGNOSIS — E78.2 HYPERLIPIDEMIA, MIXED: ICD-10-CM

## 2023-09-14 RX ORDER — LORAZEPAM 1 MG/1
1 TABLET ORAL NIGHTLY
Qty: 30 TABLET | Refills: 2 | Status: SHIPPED | OUTPATIENT
Start: 2023-09-14

## 2023-09-14 NOTE — ASSESSMENT & PLAN NOTE
I will see him is up to 10.5.  We will get an ionized calcium and PTH today.  If normal we will get repeat blood work in 3 months.

## 2023-09-14 NOTE — PROGRESS NOTES
Follow upThe ABCs of the Annual Wellness Visit  [place] to Medicare Visit    Subjective     Julie B Sipple is a 63 y.o. female who presents for a  Welcome to Medicare Visit.    The following portions of the patient's history were reviewed and   updated as appropriate: allergies, current medications, past family history, past medical history, past social history, past surgical history, and problem list.     Compared to one year ago, the patient feels her physical   health is the same.    Compared to one year ago, the patient feels her mental   health is the same.    Recent Hospitalizations:  She was not admitted to the hospital during the last year.       Current Medical Providers:  Patient Care Team:  Janak Leiva MD as PCP - General (Family Medicine)    Outpatient Medications Prior to Visit   Medication Sig Dispense Refill    amLODIPine (NORVASC) 5 MG tablet Take 1 tablet by mouth Daily. 90 tablet 1    cholecalciferol (VITAMIN D3) 10 MCG (400 UNIT) tablet Take 1 tablet by mouth Daily. 90 tablet 1    simvastatin (ZOCOR) 20 MG tablet Take 1 tablet by mouth Every Night. 90 tablet 1    traZODone (DESYREL) 50 MG tablet Take 1 tablet by mouth every night at bedtime. 90 tablet 1    LORazepam (ATIVAN) 1 MG tablet Take 1 tablet by mouth Every Night. 30 tablet 2     No facility-administered medications prior to visit.       No opioid medication identified on active medication list. I have reviewed chart for other potential  high risk medication/s and harmful drug interactions in the elderly.        Aspirin is not on active medication list.  Aspirin use is not indicated based on review of current medical condition/s. Risk of harm outweighs potential benefits.  .    Patient Active Problem List   Diagnosis    Welcome to Medicare preventive visit    Hyperglycemia    Hypercalcemia    Hypertension, essential    Hyperlipidemia, mixed    Acquired hypothyroidism    Primary insomnia    Vitamin D deficiency    Anxiety    Fear of  "flying    Prediabetes    Morbid (severe) obesity due to excess calories     Advance Care Planning   Advance Care Planning     Advance Directive is not on file.  ACP discussion was held with the patient during this visit. Patient does not have an advance directive, information provided.       Objective   Vitals:    23 1405   BP: 110/68   BP Location: Left arm   Patient Position: Sitting   Cuff Size: Adult   Pulse: 84   Resp: 16   SpO2: 97%   Weight: 66.5 kg (146 lb 8 oz)   Height: 157.5 cm (62.01\")   PainSc: 0-No pain     Estimated body mass index is 26.79 kg/m² as calculated from the following:    Height as of this encounter: 157.5 cm (62.01\").    Weight as of this encounter: 66.5 kg (146 lb 8 oz).    BMI is >= 25 and <30. (Overweight) The following options were offered after discussion;: exercise counseling/recommendations and nutrition counseling/recommendations      Does the patient have evidence of cognitive impairment?   No    Lab Results   Component Value Date    CHLPL 167 2023    TRIG 87 2023    HDL 61 2023    LDL 90 2023    VLDL 16 2023    HGBA1C 6.1 (H) 2023       Procedures       HEALTH RISK ASSESSMENT    Smoking Status:  Social History     Tobacco Use   Smoking Status Former    Packs/day: 1.00    Years: 4.00    Pack years: 4.00    Types: Cigarettes    Start date:     Quit date:     Years since quittin.7   Smokeless Tobacco Never     Alcohol Consumption:  Social History     Substance and Sexual Activity   Alcohol Use Never       Fall Risk Screen:    STEADI Fall Risk Assessment was completed, and patient is at LOW risk for falls.Assessment completed on:2023    Depression Screen:       2023     2:09 PM   PHQ-2/PHQ-9 Depression Screening   Little Interest or Pleasure in Doing Things 0-->not at all   Feeling Down, Depressed or Hopeless 0-->not at all   PHQ-9: Brief Depression Severity Measure Score 0       Health Habits and Functional and " Cognitive Screenin/14/2023     2:08 PM   Functional & Cognitive Status   Do you have difficulty preparing food and eating? No   Do you have difficulty bathing yourself, getting dressed or grooming yourself? No   Do you have difficulty using the toilet? No   Do you have difficulty moving around from place to place? No   Do you have trouble with steps or getting out of a bed or a chair? No   Current Diet Well Balanced Diet   Dental Exam Up to date   Eye Exam Up to date   Exercise (times per week) 0 times per week   Current Exercises Include Gardening;Yard Work;Walking   Do you need help using the phone?  No   Are you deaf or do you have serious difficulty hearing?  No   Do you need help to go to places out of walking distance? No   Do you need help shopping? No   Do you need help preparing meals?  No   Do you need help with housework?  No   Do you need help with laundry? No   Do you need help taking your medications? No   Do you need help managing money? No   Do you ever drive or ride in a car without wearing a seat belt? No   Have you felt unusual stress, anger or loneliness in the last month? No   Who do you live with? Spouse   If you need help, do you have trouble finding someone available to you? No   Do you have difficulty concentrating, remembering or making decisions? No       Visual Acuity:    Vision Screening    Right eye Left eye Both eyes   Without correction      With correction 20/25 20/25 20/25       Age-appropriate Screening Schedule:  Refer to the list below for future screening recommendations based on patient's age, sex and/or medical conditions. Orders for these recommended tests are listed in the plan section. The patient has been provided with a written plan.    Health Maintenance   Topic Date Due    DXA SCAN  Never done    BMI FOLLOWUP  Never done    COVID-19 Vaccine (5 - Pfizer series) 2022    COLORECTAL CANCER SCREENING  2028 (Originally 1960)    INFLUENZA VACCINE   "10/01/2023    LIPID PANEL  09/07/2024    ANNUAL WELLNESS VISIT  09/14/2024    MAMMOGRAM  08/17/2025    TDAP/TD VACCINES (3 - Td or Tdap) 03/01/2033    HEPATITIS C SCREENING  Completed    Pneumococcal Vaccine 65+  Completed    ZOSTER VACCINE  Completed        CMS Preventative Services Quick Reference  Risk Factors Identified During Encounter    None Identified  The above risks/problems have been discussed with the patient.  Pertinent information has been shared with the patient in the After Visit Summary.    Follow Up:   Initial Medicare Visit in one year    An After Visit Summary and PPPS were made available to the patient.      Additional E&M Note during same encounter follows:  Patient has multiple medical problems which are significant and separately identifiable that require additional work above and beyond the Medicare Wellness Visit.      Chief Complaint  Welcome To Medicare and Follow-up    Subjective        HPI  Julie B Sipple is also being seen today for Wellness and calcium    Review of Systems   Constitutional: Negative.    HENT: Negative.     Eyes: Negative.    Respiratory: Negative.     Cardiovascular: Negative.    Gastrointestinal: Negative.      Objective   Vital Signs:  /68 (BP Location: Left arm, Patient Position: Sitting, Cuff Size: Adult)   Pulse 84   Resp 16   Ht 157.5 cm (62.01\")   Wt 66.5 kg (146 lb 8 oz)   SpO2 97%   BMI 26.79 kg/m²     Physical Exam  Vitals and nursing note reviewed.   Constitutional:       Appearance: Normal appearance. She is normal weight.   HENT:      Head: Normocephalic and atraumatic.      Right Ear: Tympanic membrane, ear canal and external ear normal.      Left Ear: Tympanic membrane, ear canal and external ear normal.      Nose: Nose normal.      Mouth/Throat:      Mouth: Mucous membranes are dry.      Pharynx: Oropharynx is clear.   Eyes:      Extraocular Movements: Extraocular movements intact.      Conjunctiva/sclera: Conjunctivae normal.      Pupils: " Pupils are equal, round, and reactive to light.   Cardiovascular:      Rate and Rhythm: Normal rate and regular rhythm.      Pulses: Normal pulses.      Heart sounds: Normal heart sounds.   Pulmonary:      Effort: Pulmonary effort is normal.      Breath sounds: Normal breath sounds.   Musculoskeletal:      Cervical back: Normal range of motion and neck supple.   Feet:      Comments:      Neurological:      Mental Status: She is alert.                    Assessment and Plan   Diagnoses and all orders for this visit:    1. Welcome to Medicare preventive visit (Primary)  -     DEXA Bone Density Axial  -     LORazepam (ATIVAN) 1 MG tablet; Take 1 tablet by mouth Every Night.  Dispense: 30 tablet; Refill: 2  -     Hemoglobin A1c; Future  -     Lipid Panel; Future  -     Comprehensive Metabolic Panel; Future  -     Vitamin B12; Future  -     Vitamin D,25-Hydroxy; Future  -     TSH Rfx On Abnormal To Free T4; Future  -     CBC & Differential; Future    2. Primary insomnia  Assessment & Plan:  Trazodone.    Orders:  -     DEXA Bone Density Axial  -     LORazepam (ATIVAN) 1 MG tablet; Take 1 tablet by mouth Every Night.  Dispense: 30 tablet; Refill: 2  -     Hemoglobin A1c; Future  -     Lipid Panel; Future  -     Comprehensive Metabolic Panel; Future  -     Vitamin B12; Future  -     Vitamin D,25-Hydroxy; Future  -     TSH Rfx On Abnormal To Free T4; Future  -     CBC & Differential; Future    3. Anxiety  Assessment & Plan:  Refill Ativan.  [facility]ospital [facility]    Orders:  -     DEXA Bone Density Axial  -     LORazepam (ATIVAN) 1 MG tablet; Take 1 tablet by mouth Every Night.  Dispense: 30 tablet; Refill: 2  -     Hemoglobin A1c; Future  -     Lipid Panel; Future  -     Comprehensive Metabolic Panel; Future  -     Vitamin B12; Future  -     Vitamin D,25-Hydroxy; Future  -     TSH Rfx On Abnormal To Free T4; Future  -     CBC & Differential; Future    4. Hypertension, essential  Assessment & Plan:  Discussed with patient to monitor  their blood pressure and if systolic blood pressure goes above 140 or diastolic is above 90 to return to clinic.  Take medicines as directed, call for any problems, patient not having or any worrisome symptoms.        Orders:  -     DEXA Bone Density Axial  -     LORazepam (ATIVAN) 1 MG tablet; Take 1 tablet by mouth Every Night.  Dispense: 30 tablet; Refill: 2  -     Hemoglobin A1c; Future  -     Lipid Panel; Future  -     Comprehensive Metabolic Panel; Future  -     Vitamin B12; Future  -     Vitamin D,25-Hydroxy; Future  -     TSH Rfx On Abnormal To Free T4; Future  -     CBC & Differential; Future    5. Hyperlipidemia, mixed  Assessment & Plan:  HDL 61.  LDL 90.  Triglycerides 87.  We will follow.    Orders:  -     DEXA Bone Density Axial  -     LORazepam (ATIVAN) 1 MG tablet; Take 1 tablet by mouth Every Night.  Dispense: 30 tablet; Refill: 2  -     Hemoglobin A1c; Future  -     Lipid Panel; Future  -     Comprehensive Metabolic Panel; Future  -     Vitamin B12; Future  -     Vitamin D,25-Hydroxy; Future  -     TSH Rfx On Abnormal To Free T4; Future  -     CBC & Differential; Future    6. Hyperglycemia  Assessment & Plan:  A1c is 6.1.  We will follow.    Orders:  -     DEXA Bone Density Axial  -     LORazepam (ATIVAN) 1 MG tablet; Take 1 tablet by mouth Every Night.  Dispense: 30 tablet; Refill: 2  -     Hemoglobin A1c; Future  -     Lipid Panel; Future  -     Comprehensive Metabolic Panel; Future  -     Vitamin B12; Future  -     Vitamin D,25-Hydroxy; Future  -     TSH Rfx On Abnormal To Free T4; Future  -     CBC & Differential; Future    7. Vitamin D deficiency  Assessment & Plan:  Vitamin D is 93.4.  We will follow.    Orders:  -     DEXA Bone Density Axial  -     LORazepam (ATIVAN) 1 MG tablet; Take 1 tablet by mouth Every Night.  Dispense: 30 tablet; Refill: 2  -     Hemoglobin A1c; Future  -     Lipid Panel; Future  -     Comprehensive Metabolic Panel; Future  -     Vitamin B12; Future  -     Vitamin  D,25-Hydroxy; Future  -     TSH Rfx On Abnormal To Free T4; Future  -     CBC & Differential; Future    8. Hypercalcemia  Assessment & Plan:  I will see him is up to 10.5.  We will get an ionized calcium and PTH today.  If normal we will get repeat blood work in 3 months.    Orders:  -     DEXA Bone Density Axial  -     LORazepam (ATIVAN) 1 MG tablet; Take 1 tablet by mouth Every Night.  Dispense: 30 tablet; Refill: 2  -     Hemoglobin A1c; Future  -     Lipid Panel; Future  -     Comprehensive Metabolic Panel; Future  -     Vitamin B12; Future  -     Vitamin D,25-Hydroxy; Future  -     TSH Rfx On Abnormal To Free T4; Future  -     CBC & Differential; Future  -     Calcium, Ionized; Future  -     PTH, Intact; Future    9. Morbid (severe) obesity due to excess calories  Assessment & Plan:  Discussed diet and exercise.  Discussed intermittent fasting    Orders:  -     DEXA Bone Density Axial  -     LORazepam (ATIVAN) 1 MG tablet; Take 1 tablet by mouth Every Night.  Dispense: 30 tablet; Refill: 2  -     Hemoglobin A1c; Future  -     Lipid Panel; Future  -     Comprehensive Metabolic Panel; Future  -     Vitamin B12; Future  -     Vitamin D,25-Hydroxy; Future  -     TSH Rfx On Abnormal To Free T4; Future  -     CBC & Differential; Future    10. Encounter for follow-up examination after completed treatment for conditions other than malignant neoplasm  -     DEXA Bone Density Axial           I spent 10 minutes caring for Shahrzad on this date of service. This time includes time spent by me in the following activities:preparing for the visit, reviewing tests, obtaining and/or reviewing a separately obtained history, performing a medically appropriate examination and/or evaluation , counseling and educating the patient/family/caregiver, ordering medications, tests, or procedures, referring and communicating with other health care professionals , documenting information in the medical record, independently interpreting results  and communicating that information with the patient/family/caregiver, and care coordination  Follow Up   Return in about 3 months (around 12/14/2023) for Annual physical, Bloodwork 1 week prior to next appointment.  Patient was given instructions and counseling regarding her condition or for health maintenance advice. Please see specific information pulled into the AVS if appropriate.           Answers submitted by the patient for this visit:  Primary Reason for Visit (Submitted on 9/7/2023)  What is the primary reason for your visit?: Physical

## 2023-09-15 ENCOUNTER — TELEPHONE (OUTPATIENT)
Dept: FAMILY MEDICINE CLINIC | Facility: CLINIC | Age: 63
End: 2023-09-15
Payer: MEDICARE

## 2023-09-15 LAB
ALBUMIN SERPL-MCNC: 4.8 G/DL (ref 3.9–4.9)
ALBUMIN/GLOB SERPL: 2.2 {RATIO} (ref 1.2–2.2)
ALP SERPL-CCNC: 87 IU/L (ref 44–121)
ALT SERPL-CCNC: 19 IU/L (ref 0–32)
AST SERPL-CCNC: 20 IU/L (ref 0–40)
BILIRUB SERPL-MCNC: <0.2 MG/DL (ref 0–1.2)
BUN SERPL-MCNC: 17 MG/DL (ref 8–27)
BUN/CREAT SERPL: 18 (ref 12–28)
CA-I SERPL ISE-MCNC: 6 MG/DL (ref 4.5–5.6)
CALCIUM SERPL-MCNC: 10.7 MG/DL (ref 8.7–10.3)
CHLORIDE SERPL-SCNC: 105 MMOL/L (ref 96–106)
CO2 SERPL-SCNC: 23 MMOL/L (ref 20–29)
CREAT SERPL-MCNC: 0.94 MG/DL (ref 0.57–1)
EGFRCR SERPLBLD CKD-EPI 2021: 67 ML/MIN/1.73
GLOBULIN SER CALC-MCNC: 2.2 G/DL (ref 1.5–4.5)
GLUCOSE SERPL-MCNC: 103 MG/DL (ref 70–99)
POTASSIUM SERPL-SCNC: 3.9 MMOL/L (ref 3.5–5.2)
PROT SERPL-MCNC: 7 G/DL (ref 6–8.5)
PTH-INTACT SERPL-MCNC: 106 PG/ML (ref 15–65)
SODIUM SERPL-SCNC: 140 MMOL/L (ref 134–144)

## 2023-09-15 NOTE — TELEPHONE ENCOUNTER
Patient's calcium was elevated.  Ionized calcium was 6.0 and elevated.  Her PTH was 106.  She has seen endocrinology in the past for this.  I wanted to send her to endocrinology but she was just recheck it in 3 months.  We will recheck it then.  If it goes over 11 I will send her back to endocrinology.

## 2023-10-14 DIAGNOSIS — I10 HYPERTENSION, ESSENTIAL: ICD-10-CM

## 2023-10-16 RX ORDER — AMLODIPINE BESYLATE 5 MG/1
5 TABLET ORAL DAILY
Qty: 30 TABLET | Refills: 0 | Status: SHIPPED | OUTPATIENT
Start: 2023-10-16

## 2023-10-16 NOTE — TELEPHONE ENCOUNTER
Rx Refill Note    Requested Prescriptions     Pending Prescriptions Disp Refills    amLODIPine (NORVASC) 5 MG tablet [Pharmacy Med Name: amLODIPine BESYLATE 5 MG TAB] 30 tablet 0     Sig: TAKE ONE TABLET BY MOUTH DAILY        Last office visit with prescribing clinician: 9/14/2023      Next office visit with prescribing clinician: Visit date not found   Last labs:   Last refill: 03/01/2023   Pharmacy (be sure to add in Epic). Correct    Dr. Leiva patient.  There was a drug interaction that populated.

## 2023-11-07 DIAGNOSIS — E78.2 HYPERLIPIDEMIA, MIXED: ICD-10-CM

## 2023-11-07 RX ORDER — SIMVASTATIN 20 MG
20 TABLET ORAL NIGHTLY
Qty: 90 TABLET | Refills: 0 | Status: SHIPPED | OUTPATIENT
Start: 2023-11-07

## 2023-11-07 NOTE — TELEPHONE ENCOUNTER
Rx Refill Note    Requested Prescriptions     Pending Prescriptions Disp Refills    simvastatin (ZOCOR) 20 MG tablet [Pharmacy Med Name: SIMVASTATIN 20 MG TABLET] 90 tablet 0     Sig: TAKE ONE TABLET BY MOUTH ONCE NIGHTLY        Last office visit with prescribing clinician: 9/14/2023      Next office visit with prescribing clinician: Visit date not found   Last labs:   Last refill: 03/01/2023   Pharmacy (be sure to add in Epic). correct

## 2024-01-05 ENCOUNTER — TELEPHONE (OUTPATIENT)
Dept: FAMILY MEDICINE CLINIC | Facility: CLINIC | Age: 64
End: 2024-01-05
Payer: MEDICARE

## 2024-01-05 DIAGNOSIS — I10 HYPERTENSION, ESSENTIAL: ICD-10-CM

## 2024-01-05 NOTE — TELEPHONE ENCOUNTER
Caller: Sipple, Julie B    Relationship: Self    Best call back number: [phone]    Requested Prescriptions:   Requested Prescriptions      No prescriptions requested or ordered in this encounter      amLODIPine (NORVASC) 5 MG tablet     Pharmacy where request should be sent: [facility] [id number] - [place], [place] - [address] AT [facility] [address] - [phone]  - [phone] FX     Last office visit with prescribing clinician: 9/14/2023   Last telemedicine visit with prescribing clinician: Visit date not found   Next office visit with prescribing clinician: 1/17/2024     Does the patient have less than a 3 day supply:  [x] Yes  [] No    Would you like a call back once the refill request has been completed: [] Yes [x] No    If the office needs to give you a call back, can they leave a voicemail: [] Yes [x] No    Christie Malhotra, PCT   01/05/24 14:37 EST

## 2024-01-06 RX ORDER — AMLODIPINE BESYLATE 5 MG/1
5 TABLET ORAL DAILY
Qty: 30 TABLET | Refills: 0 | Status: SHIPPED | OUTPATIENT
Start: 2024-01-06

## 2024-01-10 ENCOUNTER — LAB (OUTPATIENT)
Dept: FAMILY MEDICINE CLINIC | Facility: CLINIC | Age: 64
End: 2024-01-10
Payer: MEDICARE

## 2024-01-10 DIAGNOSIS — E83.52 HYPERCALCEMIA: ICD-10-CM

## 2024-01-10 DIAGNOSIS — Z00.00 WELCOME TO MEDICARE PREVENTIVE VISIT: ICD-10-CM

## 2024-01-10 DIAGNOSIS — F41.9 ANXIETY: ICD-10-CM

## 2024-01-10 DIAGNOSIS — R73.9 HYPERGLYCEMIA: ICD-10-CM

## 2024-01-10 DIAGNOSIS — I10 HYPERTENSION, ESSENTIAL: ICD-10-CM

## 2024-01-10 DIAGNOSIS — E55.9 VITAMIN D DEFICIENCY: ICD-10-CM

## 2024-01-10 DIAGNOSIS — E78.2 HYPERLIPIDEMIA, MIXED: ICD-10-CM

## 2024-01-10 DIAGNOSIS — E66.01 MORBID (SEVERE) OBESITY DUE TO EXCESS CALORIES: ICD-10-CM

## 2024-01-10 DIAGNOSIS — F51.01 PRIMARY INSOMNIA: ICD-10-CM

## 2024-01-11 LAB
25(OH)D3+25(OH)D2 SERPL-MCNC: 100 NG/ML (ref 30–100)
ALBUMIN SERPL-MCNC: 4.9 G/DL (ref 3.9–4.9)
ALBUMIN/GLOB SERPL: 2.5 {RATIO} (ref 1.2–2.2)
ALP SERPL-CCNC: 88 IU/L (ref 44–121)
ALT SERPL-CCNC: 24 IU/L (ref 0–32)
AST SERPL-CCNC: 24 IU/L (ref 0–40)
BASOPHILS # BLD AUTO: 0 X10E3/UL (ref 0–0.2)
BASOPHILS NFR BLD AUTO: 0 %
BILIRUB SERPL-MCNC: 0.4 MG/DL (ref 0–1.2)
BUN SERPL-MCNC: 17 MG/DL (ref 8–27)
BUN/CREAT SERPL: 19 (ref 12–28)
CALCIUM SERPL-MCNC: 11.3 MG/DL (ref 8.7–10.3)
CHLORIDE SERPL-SCNC: 105 MMOL/L (ref 96–106)
CHOLEST SERPL-MCNC: 186 MG/DL (ref 100–199)
CO2 SERPL-SCNC: 23 MMOL/L (ref 20–29)
CREAT SERPL-MCNC: 0.89 MG/DL (ref 0.57–1)
EGFRCR SERPLBLD CKD-EPI 2021: 71 ML/MIN/1.73
EOSINOPHIL # BLD AUTO: 0.2 X10E3/UL (ref 0–0.4)
EOSINOPHIL NFR BLD AUTO: 2 %
ERYTHROCYTE [DISTWIDTH] IN BLOOD BY AUTOMATED COUNT: 12.6 % (ref 11.7–15.4)
GLOBULIN SER CALC-MCNC: 2 G/DL (ref 1.5–4.5)
GLUCOSE SERPL-MCNC: 112 MG/DL (ref 70–99)
HBA1C MFR BLD: 6.2 % (ref 4.8–5.6)
HCT VFR BLD AUTO: 42.4 % (ref 34–46.6)
HDLC SERPL-MCNC: 68 MG/DL
HGB BLD-MCNC: 13.8 G/DL (ref 11.1–15.9)
IMM GRANULOCYTES # BLD AUTO: 0 X10E3/UL (ref 0–0.1)
IMM GRANULOCYTES NFR BLD AUTO: 0 %
LDLC SERPL CALC-MCNC: 98 MG/DL (ref 0–99)
LYMPHOCYTES # BLD AUTO: 2.8 X10E3/UL (ref 0.7–3.1)
LYMPHOCYTES NFR BLD AUTO: 41 %
MCH RBC QN AUTO: 28.1 PG (ref 26.6–33)
MCHC RBC AUTO-ENTMCNC: 32.5 G/DL (ref 31.5–35.7)
MCV RBC AUTO: 86 FL (ref 79–97)
MONOCYTES # BLD AUTO: 0.5 X10E3/UL (ref 0.1–0.9)
MONOCYTES NFR BLD AUTO: 7 %
NEUTROPHILS # BLD AUTO: 3.3 X10E3/UL (ref 1.4–7)
NEUTROPHILS NFR BLD AUTO: 50 %
PLATELET # BLD AUTO: 244 X10E3/UL (ref 150–450)
POTASSIUM SERPL-SCNC: 4 MMOL/L (ref 3.5–5.2)
PROT SERPL-MCNC: 6.9 G/DL (ref 6–8.5)
RBC # BLD AUTO: 4.91 X10E6/UL (ref 3.77–5.28)
SODIUM SERPL-SCNC: 143 MMOL/L (ref 134–144)
TRIGL SERPL-MCNC: 113 MG/DL (ref 0–149)
TSH SERPL DL<=0.005 MIU/L-ACNC: 3.31 UIU/ML (ref 0.45–4.5)
VIT B12 SERPL-MCNC: 296 PG/ML (ref 232–1245)
VLDLC SERPL CALC-MCNC: 20 MG/DL (ref 5–40)
WBC # BLD AUTO: 6.7 X10E3/UL (ref 3.4–10.8)

## 2024-01-17 ENCOUNTER — OFFICE VISIT (OUTPATIENT)
Dept: FAMILY MEDICINE CLINIC | Facility: CLINIC | Age: 64
End: 2024-01-17
Payer: MEDICARE

## 2024-01-17 VITALS
RESPIRATION RATE: 16 BRPM | HEART RATE: 101 BPM | DIASTOLIC BLOOD PRESSURE: 82 MMHG | WEIGHT: 143 LBS | OXYGEN SATURATION: 99 % | SYSTOLIC BLOOD PRESSURE: 118 MMHG | HEIGHT: 62 IN | BODY MASS INDEX: 26.31 KG/M2

## 2024-01-17 DIAGNOSIS — Z79.899 ENCOUNTER FOR LONG-TERM (CURRENT) USE OF OTHER MEDICATIONS: ICD-10-CM

## 2024-01-17 DIAGNOSIS — F41.9 ANXIETY: ICD-10-CM

## 2024-01-17 DIAGNOSIS — F51.01 PRIMARY INSOMNIA: ICD-10-CM

## 2024-01-17 DIAGNOSIS — R73.9 HYPERGLYCEMIA: ICD-10-CM

## 2024-01-17 DIAGNOSIS — E83.52 HYPERCALCEMIA: Primary | ICD-10-CM

## 2024-01-17 DIAGNOSIS — E55.9 VITAMIN D DEFICIENCY: ICD-10-CM

## 2024-01-17 DIAGNOSIS — I10 HYPERTENSION, ESSENTIAL: ICD-10-CM

## 2024-01-17 DIAGNOSIS — Z12.11 SCREENING FOR MALIGNANT NEOPLASM OF COLON: ICD-10-CM

## 2024-01-17 DIAGNOSIS — E78.2 HYPERLIPIDEMIA, MIXED: ICD-10-CM

## 2024-01-17 LAB
POC AMPHETAMINES: NEGATIVE
POC BARBITURATES: NEGATIVE
POC BENZODIAZEPHINES: POSITIVE
POC COCAINE: NEGATIVE
POC METHADONE: NEGATIVE
POC METHAMPHETAMINE SCREEN URINE: NEGATIVE
POC OPIATES: NEGATIVE
POC OXYCODONE: NEGATIVE
POC PHENCYCLIDINE: NEGATIVE
POC PROPOXYPHENE: NEGATIVE
POC THC: NEGATIVE
POC TRICYCLIC ANTIDEPRESSANTS: NEGATIVE

## 2024-01-17 RX ORDER — AMLODIPINE BESYLATE 5 MG/1
5 TABLET ORAL DAILY
Qty: 90 TABLET | Refills: 1 | Status: SHIPPED | OUTPATIENT
Start: 2024-01-17

## 2024-01-17 RX ORDER — OMEGA-3S/DHA/EPA/FISH OIL/D3 300MG-1000
400 CAPSULE ORAL DAILY
Qty: 90 TABLET | Refills: 1 | Status: SHIPPED | OUTPATIENT
Start: 2024-01-17

## 2024-01-17 RX ORDER — LORAZEPAM 1 MG/1
1 TABLET ORAL NIGHTLY
Qty: 30 TABLET | Refills: 2 | Status: SHIPPED | OUTPATIENT
Start: 2024-01-17

## 2024-01-17 RX ORDER — SIMVASTATIN 20 MG
20 TABLET ORAL NIGHTLY
Qty: 90 TABLET | Refills: 1 | Status: SHIPPED | OUTPATIENT
Start: 2024-01-17

## 2024-01-17 RX ORDER — TRAZODONE HYDROCHLORIDE 50 MG/1
50 TABLET ORAL
Qty: 90 TABLET | Refills: 1 | Status: SHIPPED | OUTPATIENT
Start: 2024-01-17

## 2024-01-17 NOTE — ASSESSMENT & PLAN NOTE
Calcium is 11.3.  This is steadily increased over the past year.  She has seen [facility] and they did not want to do anything unless her calcium hit 12 and she had kidney stones.  I am going to recheck in 3 months.  If it continues to increase I am going to send her back to [facility].  She does have hyperparathyroidism.

## 2024-01-17 NOTE — PROGRESS NOTES
Patient Name: Julie B Sipple  : 1960   MRN: [id number]     Chief Complaint:    Chief Complaint   Patient presents with    Follow-up       History of Present Illness: Julie B Sipple is a 64 y.o. female who is here today for follow up.  HPI        Review of Systems:   Review of Systems   Constitutional: Negative.    HENT: Negative.     Eyes: Negative.    Respiratory: Negative.     Cardiovascular: Negative.    Gastrointestinal: Negative.    Neurological: Negative.         Past Medical History:   Past Medical History:   Diagnosis Date    Anxiety     Fear of flying     Mixed hyperlipidemia     Prediabetes        Past Surgical History:   Past Surgical History:   Procedure Laterality Date    TUBAL ABDOMINAL LIGATION Bilateral        Family History:   Family History   Problem Relation Age of Onset    Hypertension Mother     Breast cancer Mother 42    Hypertension Sister     Stroke Maternal Grandmother 65    Other Daughter         BLADDER DISEASE       Social History:   Social History     Socioeconomic History    Marital status:    Tobacco Use    Smoking status: Former     Packs/day: 1.00     Years: 4.00     Additional pack years: 0.00     Total pack years: 4.00     Types: Cigarettes     Start date:      Quit date:      Years since quittin.0    Smokeless tobacco: Never   Substance and Sexual Activity    Alcohol use: Never    Drug use: Never    Sexual activity: Defer       Medications:     Current Outpatient Medications:     amLODIPine (NORVASC) 5 MG tablet, Take 1 tablet by mouth Daily., Disp: 90 tablet, Rfl: 1    cholecalciferol (VITAMIN D3) 10 MCG (400 UNIT) tablet, Take 1 tablet by mouth Daily., Disp: 90 tablet, Rfl: 1    LORazepam (ATIVAN) 1 MG tablet, Take 1 tablet by mouth Every Night., Disp: 30 tablet, Rfl: 2    simvastatin (ZOCOR) 20 MG tablet, Take 1 tablet by mouth Every Night., Disp: 90 tablet, Rfl: 1    traZODone (DESYREL) 50 MG tablet, Take 1 tablet by mouth every night at  "bedtime., Disp: 90 tablet, Rfl: 1    Allergies:   No Known Allergies      Physical Exam:  Vital Signs:   Vitals:    01/17/24 1137   BP: 118/82   BP Location: Left arm   Patient Position: Sitting   Cuff Size: Adult   Pulse: 101   Resp: 16   SpO2: 99%   Weight: 64.9 kg (143 lb)   Height: 157.3 cm (61.93\")   PainSc: 0-No pain     Body mass index is 26.22 kg/m².     Physical Exam  Vitals and nursing note reviewed.   Constitutional:       Appearance: Normal appearance. She is normal weight.   HENT:      Head: Normocephalic and atraumatic.      Right Ear: Tympanic membrane, ear canal and external ear normal.      Left Ear: Tympanic membrane, ear canal and external ear normal.      Nose: Nose normal.      Mouth/Throat:      Mouth: Mucous membranes are dry.      Pharynx: Oropharynx is clear.   Eyes:      Extraocular Movements: Extraocular movements intact.      Conjunctiva/sclera: Conjunctivae normal.      Pupils: Pupils are equal, round, and reactive to light.   Cardiovascular:      Rate and Rhythm: Normal rate and regular rhythm.      Pulses: Normal pulses.      Heart sounds: Normal heart sounds.   Pulmonary:      Effort: Pulmonary effort is normal.      Breath sounds: Normal breath sounds.   Musculoskeletal:      Cervical back: Normal range of motion and neck supple.   Feet:      Comments:      Neurological:      Mental Status: She is alert.         Procedures      Assessment/Plan:   Diagnoses and all orders for this visit:    1. Hypercalcemia (Primary)  Assessment & Plan:  Calcium is 11.3.  This is steadily increased over the past year.  She has seen [facility] and they did not want to do anything unless her calcium hit 12 and she had kidney stones.  I am going to recheck in 3 months.  If it continues to increase I am going to send her back to [facility].  She does have hyperparathyroidism.    Orders:  -     LORazepam (ATIVAN) 1 MG tablet; Take 1 tablet by mouth Every Night.  Dispense: 30 tablet; Refill: " 2  -     Hemoglobin A1c; Future  -     Lipid Panel; Future  -     Comprehensive Metabolic Panel; Future  -     Vitamin B12; Future  -     Vitamin D,25-Hydroxy; Future  -     TSH Rfx On Abnormal To Free T4; Future  -     CBC & Differential; Future    2. Primary insomnia  Assessment & Plan:  Taking trazodone and Ativan.    Orders:  -     LORazepam (ATIVAN) 1 MG tablet; Take 1 tablet by mouth Every Night.  Dispense: 30 tablet; Refill: 2  -     traZODone (DESYREL) 50 MG tablet; Take 1 tablet by mouth every night at bedtime.  Dispense: 90 tablet; Refill: 1  -     POC Urine Drug Screen, Triage  -     Hemoglobin A1c; Future  -     Lipid Panel; Future  -     Comprehensive Metabolic Panel; Future  -     Vitamin B12; Future  -     Vitamin D,25-Hydroxy; Future  -     TSH Rfx On Abnormal To Free T4; Future  -     CBC & Differential; Future    3. Anxiety  Assessment & Plan:  Refill Ativan.  [facility] 1.    Orders:  -     LORazepam (ATIVAN) 1 MG tablet; Take 1 tablet by mouth Every Night.  Dispense: 30 tablet; Refill: 2  -     Hemoglobin A1c; Future  -     Lipid Panel; Future  -     Comprehensive Metabolic Panel; Future  -     Vitamin B12; Future  -     Vitamin D,25-Hydroxy; Future  -     TSH Rfx On Abnormal To Free T4; Future  -     CBC & Differential; Future    4. Hypertension, essential  Assessment & Plan:  Discussed with patient to monitor their blood pressure and if systolic blood pressure goes above 140 or diastolic is above 90 to return to clinic.  Take medicines as directed, call for any problems, patient not having or any worrisome symptoms.        Orders:  -     LORazepam (ATIVAN) 1 MG tablet; Take 1 tablet by mouth Every Night.  Dispense: 30 tablet; Refill: 2  -     amLODIPine (NORVASC) 5 MG tablet; Take 1 tablet by mouth Daily.  Dispense: 90 tablet; Refill: 1  -     Hemoglobin A1c; Future  -     Lipid Panel; Future  -     Comprehensive Metabolic Panel; Future  -     Vitamin B12; Future  -     Vitamin D,25-Hydroxy;  Future  -     TSH Rfx On Abnormal To Free T4; Future  -     CBC & Differential; Future    5. Hyperlipidemia, mixed  Assessment & Plan:  HDL 68.  LDL 98.  Triglycerides 113.  Recheck in 3 months.    Orders:  -     LORazepam (ATIVAN) 1 MG tablet; Take 1 tablet by mouth Every Night.  Dispense: 30 tablet; Refill: 2  -     simvastatin (ZOCOR) 20 MG tablet; Take 1 tablet by mouth Every Night.  Dispense: 90 tablet; Refill: 1  -     Hemoglobin A1c; Future  -     Lipid Panel; Future  -     Comprehensive Metabolic Panel; Future  -     Vitamin B12; Future  -     Vitamin D,25-Hydroxy; Future  -     TSH Rfx On Abnormal To Free T4; Future  -     CBC & Differential; Future    6. Hyperglycemia  Assessment & Plan:  A1c 6.2.  Recheck in 3 months.    Orders:  -     LORazepam (ATIVAN) 1 MG tablet; Take 1 tablet by mouth Every Night.  Dispense: 30 tablet; Refill: 2  -     Hemoglobin A1c; Future  -     Lipid Panel; Future  -     Comprehensive Metabolic Panel; Future  -     Vitamin B12; Future  -     Vitamin D,25-Hydroxy; Future  -     TSH Rfx On Abnormal To Free T4; Future  -     CBC & Differential; Future    7. Vitamin D deficiency  Assessment & Plan:  Vitamin D is 100.  Stop supplementation and recheck in 3 months.    Orders:  -     LORazepam (ATIVAN) 1 MG tablet; Take 1 tablet by mouth Every Night.  Dispense: 30 tablet; Refill: 2  -     cholecalciferol (VITAMIN D3) 10 MCG (400 UNIT) tablet; Take 1 tablet by mouth Daily.  Dispense: 90 tablet; Refill: 1  -     Hemoglobin A1c; Future  -     Lipid Panel; Future  -     Comprehensive Metabolic Panel; Future  -     Vitamin B12; Future  -     Vitamin D,25-Hydroxy; Future  -     TSH Rfx On Abnormal To Free T4; Future  -     CBC & Differential; Future    8. Encounter for long-term (current) use of other medications  -     POC Urine Drug Screen, Triage  -     Hemoglobin A1c; Future  -     Lipid Panel; Future  -     Comprehensive Metabolic Panel; Future  -     Vitamin B12; Future  -     Vitamin  D,25-Hydroxy; Future  -     TSH Rfx On Abnormal To Free T4; Future  -     CBC & Differential; Future    9. Screening for malignant neoplasm of colon  -     Cologuard - Stool, Per Rectum; Future             Follow Up:   Return in about 3 months (around 4/17/2024) for Annual physical, Bloodwork 1 week prior to next appointment.    I spent 15 total minutes, face-to-face, caring for Shahrzad loyd.  of this time involved counseling and/or coordination of care as documented within this note.       Janak Leiva MD  [facility] [place] [facility]     Answers submitted by the patient for this visit:  Other (Submitted on 1/10/2024)  Please describe your symptoms.: Followup on bloodwork.   Refill prescriptions.  Have you had these symptoms before?: No  How long have you been having these symptoms?: 1-4 days  Primary Reason for Visit (Submitted on 1/10/2024)  What is the primary reason for your visit?: Other

## 2024-05-13 ENCOUNTER — LAB (OUTPATIENT)
Dept: FAMILY MEDICINE CLINIC | Facility: CLINIC | Age: 64
End: 2024-05-13
Payer: MEDICARE

## 2024-05-13 DIAGNOSIS — F51.01 PRIMARY INSOMNIA: ICD-10-CM

## 2024-05-13 DIAGNOSIS — Z79.899 ENCOUNTER FOR LONG-TERM (CURRENT) USE OF OTHER MEDICATIONS: ICD-10-CM

## 2024-05-13 DIAGNOSIS — E55.9 VITAMIN D DEFICIENCY: ICD-10-CM

## 2024-05-13 DIAGNOSIS — R73.9 HYPERGLYCEMIA: ICD-10-CM

## 2024-05-13 DIAGNOSIS — I10 HYPERTENSION, ESSENTIAL: ICD-10-CM

## 2024-05-13 DIAGNOSIS — E83.52 HYPERCALCEMIA: ICD-10-CM

## 2024-05-13 DIAGNOSIS — E78.2 HYPERLIPIDEMIA, MIXED: ICD-10-CM

## 2024-05-13 DIAGNOSIS — F41.9 ANXIETY: ICD-10-CM

## 2024-05-13 PROCEDURE — 36415 COLL VENOUS BLD VENIPUNCTURE: CPT | Performed by: FAMILY MEDICINE

## 2024-05-14 LAB
25(OH)D3+25(OH)D2 SERPL-MCNC: 51.3 NG/ML (ref 30–100)
ALBUMIN SERPL-MCNC: 4.4 G/DL (ref 3.9–4.9)
ALBUMIN/GLOB SERPL: 2 {RATIO} (ref 1.2–2.2)
ALP SERPL-CCNC: 91 IU/L (ref 44–121)
ALT SERPL-CCNC: 22 IU/L (ref 0–32)
AST SERPL-CCNC: 17 IU/L (ref 0–40)
BASOPHILS # BLD AUTO: 0 X10E3/UL (ref 0–0.2)
BASOPHILS NFR BLD AUTO: 0 %
BILIRUB SERPL-MCNC: 0.5 MG/DL (ref 0–1.2)
BUN SERPL-MCNC: 18 MG/DL (ref 8–27)
BUN/CREAT SERPL: 18 (ref 12–28)
CALCIUM SERPL-MCNC: 11 MG/DL (ref 8.7–10.3)
CHLORIDE SERPL-SCNC: 102 MMOL/L (ref 96–106)
CHOLEST SERPL-MCNC: 174 MG/DL (ref 100–199)
CO2 SERPL-SCNC: 22 MMOL/L (ref 20–29)
CREAT SERPL-MCNC: 0.98 MG/DL (ref 0.57–1)
EGFRCR SERPLBLD CKD-EPI 2021: 64 ML/MIN/1.73
EOSINOPHIL # BLD AUTO: 0.2 X10E3/UL (ref 0–0.4)
EOSINOPHIL NFR BLD AUTO: 3 %
ERYTHROCYTE [DISTWIDTH] IN BLOOD BY AUTOMATED COUNT: 12.6 % (ref 11.7–15.4)
GLOBULIN SER CALC-MCNC: 2.2 G/DL (ref 1.5–4.5)
GLUCOSE SERPL-MCNC: 116 MG/DL (ref 70–99)
HBA1C MFR BLD: 6.4 % (ref 4.8–5.6)
HCT VFR BLD AUTO: 45.7 % (ref 34–46.6)
HDLC SERPL-MCNC: 61 MG/DL
HGB BLD-MCNC: 14.6 G/DL (ref 11.1–15.9)
IMM GRANULOCYTES # BLD AUTO: 0 X10E3/UL (ref 0–0.1)
IMM GRANULOCYTES NFR BLD AUTO: 0 %
LDLC SERPL CALC-MCNC: 85 MG/DL (ref 0–99)
LYMPHOCYTES # BLD AUTO: 2.8 X10E3/UL (ref 0.7–3.1)
LYMPHOCYTES NFR BLD AUTO: 37 %
MCH RBC QN AUTO: 27.8 PG (ref 26.6–33)
MCHC RBC AUTO-ENTMCNC: 31.9 G/DL (ref 31.5–35.7)
MCV RBC AUTO: 87 FL (ref 79–97)
MONOCYTES # BLD AUTO: 0.7 X10E3/UL (ref 0.1–0.9)
MONOCYTES NFR BLD AUTO: 9 %
NEUTROPHILS # BLD AUTO: 3.9 X10E3/UL (ref 1.4–7)
NEUTROPHILS NFR BLD AUTO: 51 %
PLATELET # BLD AUTO: 268 X10E3/UL (ref 150–450)
POTASSIUM SERPL-SCNC: 3.9 MMOL/L (ref 3.5–5.2)
PROT SERPL-MCNC: 6.6 G/DL (ref 6–8.5)
RBC # BLD AUTO: 5.26 X10E6/UL (ref 3.77–5.28)
SODIUM SERPL-SCNC: 140 MMOL/L (ref 134–144)
TRIGL SERPL-MCNC: 164 MG/DL (ref 0–149)
TSH SERPL DL<=0.005 MIU/L-ACNC: 2.3 UIU/ML (ref 0.45–4.5)
VIT B12 SERPL-MCNC: 243 PG/ML (ref 232–1245)
VLDLC SERPL CALC-MCNC: 28 MG/DL (ref 5–40)
WBC # BLD AUTO: 7.5 X10E3/UL (ref 3.4–10.8)

## 2024-05-29 ENCOUNTER — OFFICE VISIT (OUTPATIENT)
Dept: FAMILY MEDICINE CLINIC | Facility: CLINIC | Age: 64
End: 2024-05-29
Payer: MEDICARE

## 2024-05-29 DIAGNOSIS — F41.9 ANXIETY: ICD-10-CM

## 2024-05-29 DIAGNOSIS — I10 HYPERTENSION, ESSENTIAL: ICD-10-CM

## 2024-05-29 DIAGNOSIS — E83.52 HYPERCALCEMIA: ICD-10-CM

## 2024-05-29 DIAGNOSIS — E55.9 VITAMIN D DEFICIENCY: ICD-10-CM

## 2024-05-29 DIAGNOSIS — E78.2 HYPERLIPIDEMIA, MIXED: ICD-10-CM

## 2024-05-29 DIAGNOSIS — R73.9 HYPERGLYCEMIA: ICD-10-CM

## 2024-05-29 DIAGNOSIS — E21.3 HYPERPARATHYROIDISM: Primary | ICD-10-CM

## 2024-05-29 DIAGNOSIS — F51.01 PRIMARY INSOMNIA: ICD-10-CM

## 2024-05-29 PROCEDURE — 99214 OFFICE O/P EST MOD 30 MIN: CPT | Performed by: FAMILY MEDICINE

## 2024-05-29 PROCEDURE — G2211 COMPLEX E/M VISIT ADD ON: HCPCS | Performed by: FAMILY MEDICINE

## 2024-05-29 PROCEDURE — 1126F AMNT PAIN NOTED NONE PRSNT: CPT | Performed by: FAMILY MEDICINE

## 2024-05-29 RX ORDER — LORAZEPAM 1 MG/1
1 TABLET ORAL NIGHTLY
Qty: 30 TABLET | Refills: 2 | Status: SHIPPED | OUTPATIENT
Start: 2024-05-29

## 2024-05-29 NOTE — ASSESSMENT & PLAN NOTE
Has no kidney stones, or complications at this time.  She does not want treatment for her hyperparathyroidism.

## 2024-05-29 NOTE — ASSESSMENT & PLAN NOTE
Patient has been to [facility].  They did not want to do anything at that time.  She does not want to go back.  She has not had any kidney stones.  Calcium has come down a little bit since last visit.  Recheck in 6 months.

## 2024-05-29 NOTE — PROGRESS NOTES
Patient Name: Julie B Sipple  : 1960   MRN: [id number]     Chief Complaint:    Chief Complaint   Patient presents with    Follow-up       History of Present Illness: Julie B Sipple is a 64 y.o. female who is here today for follow up on calcium and anxiety  HPI        Review of Systems:   Review of Systems   Constitutional: Negative.    HENT: Negative.     Eyes: Negative.    Respiratory: Negative.     Cardiovascular: Negative.    Gastrointestinal: Negative.    Neurological: Negative.         Past Medical History:   Past Medical History:   Diagnosis Date    Anxiety     Fear of flying     Mixed hyperlipidemia     Prediabetes        Past Surgical History:   Past Surgical History:   Procedure Laterality Date    TUBAL ABDOMINAL LIGATION Bilateral        Family History:   Family History   Problem Relation Age of Onset    Hypertension Mother     Breast cancer Mother 42    Hypertension Sister     Stroke Maternal Grandmother 65    Other Daughter         BLADDER DISEASE       Social History:   Social History     Socioeconomic History    Marital status:    Tobacco Use    Smoking status: Former     Current packs/day: 0.00     Average packs/day: 1 pack/day for 4.0 years (4.0 ttl pk-yrs)     Types: Cigarettes     Start date:      Quit date:      Years since quittin.4    Smokeless tobacco: Never   Substance and Sexual Activity    Alcohol use: Never    Drug use: Never    Sexual activity: Defer       Medications:     Current Outpatient Medications:     amLODIPine (NORVASC) 5 MG tablet, Take 1 tablet by mouth Daily., Disp: 90 tablet, Rfl: 1    cholecalciferol (VITAMIN D3) 10 MCG (400 UNIT) tablet, Take 1 tablet by mouth Daily., Disp: 90 tablet, Rfl: 1    LORazepam (ATIVAN) 1 MG tablet, Take 1 tablet by mouth Every Night., Disp: 30 tablet, Rfl: 2    simvastatin (ZOCOR) 20 MG tablet, Take 1 tablet by mouth Every Night., Disp: 90 tablet, Rfl: 1    traZODone (DESYREL) 50 MG tablet, Take 1 tablet by mouth  every night at bedtime., Disp: 90 tablet, Rfl: 1    Allergies:   No Known Allergies      Physical Exam:  Vital Signs: There were no vitals filed for this visit.  There is no height or weight on file to calculate BMI.     Physical Exam  Vitals and nursing note reviewed.   Constitutional:       Appearance: Normal appearance. She is normal weight.   HENT:      Head: Normocephalic and atraumatic.      Right Ear: Tympanic membrane, ear canal and external ear normal.      Left Ear: Tympanic membrane, ear canal and external ear normal.      Nose: Nose normal.      Mouth/Throat:      Mouth: Mucous membranes are dry.      Pharynx: Oropharynx is clear.   Eyes:      Extraocular Movements: Extraocular movements intact.      Conjunctiva/sclera: Conjunctivae normal.      Pupils: Pupils are equal, round, and reactive to light.   Cardiovascular:      Rate and Rhythm: Normal rate and regular rhythm.      Pulses: Normal pulses.      Heart sounds: Normal heart sounds.   Pulmonary:      Effort: Pulmonary effort is normal.      Breath sounds: Normal breath sounds.   Musculoskeletal:      Cervical back: Normal range of motion and neck supple.   Feet:      Comments:      Neurological:      Mental Status: She is alert.         Procedures      Assessment/Plan:   Diagnoses and all orders for this visit:    1. Hyperparathyroidism (Primary)  Assessment & Plan:  Patient has been to .  They did not want to do anything at that time.  She does not want to go back.  She has not had any kidney stones.  Calcium has come down a little bit since last visit.  Recheck in 6 months.    Orders:  -     Hemoglobin A1c; Future  -     Vitamin B12; Future  -     Vitamin D,25-Hydroxy; Future  -     Lipid Panel; Future  -     Comprehensive Metabolic Panel; Future  -     TSH Rfx On Abnormal To Free T4; Future  -     CBC & Differential; Future    2. Primary insomnia  -     LORazepam (ATIVAN) 1 MG tablet; Take 1 tablet by mouth Every Night.  Dispense: 30 tablet;  Refill: 2  -     Hemoglobin A1c; Future  -     Vitamin B12; Future  -     Vitamin D,25-Hydroxy; Future  -     Lipid Panel; Future  -     Comprehensive Metabolic Panel; Future  -     TSH Rfx On Abnormal To Free T4; Future  -     CBC & Differential; Future    3. Anxiety  Assessment & Plan:  Refill Ativan.  House bill 1    Orders:  -     LORazepam (ATIVAN) 1 MG tablet; Take 1 tablet by mouth Every Night.  Dispense: 30 tablet; Refill: 2  -     Hemoglobin A1c; Future  -     Vitamin B12; Future  -     Vitamin D,25-Hydroxy; Future  -     Lipid Panel; Future  -     Comprehensive Metabolic Panel; Future  -     TSH Rfx On Abnormal To Free T4; Future  -     CBC & Differential; Future    4. Hypertension, essential  Assessment & Plan:  Discussed with patient to monitor their blood pressure and if systolic blood pressure goes above 140 or diastolic is above 90 to return to clinic.  Take medicines as directed, call for any problems, patient not having or any worrisome symptoms.        Orders:  -     LORazepam (ATIVAN) 1 MG tablet; Take 1 tablet by mouth Every Night.  Dispense: 30 tablet; Refill: 2  -     Hemoglobin A1c; Future  -     Vitamin B12; Future  -     Vitamin D,25-Hydroxy; Future  -     Lipid Panel; Future  -     Comprehensive Metabolic Panel; Future  -     TSH Rfx On Abnormal To Free T4; Future  -     CBC & Differential; Future    5. Hyperlipidemia, mixed  Assessment & Plan:  HDL 61.  LDL 85.  Triglycerides 164.  Recheck in 6 months.    Orders:  -     LORazepam (ATIVAN) 1 MG tablet; Take 1 tablet by mouth Every Night.  Dispense: 30 tablet; Refill: 2  -     Hemoglobin A1c; Future  -     Vitamin B12; Future  -     Vitamin D,25-Hydroxy; Future  -     Lipid Panel; Future  -     Comprehensive Metabolic Panel; Future  -     TSH Rfx On Abnormal To Free T4; Future  -     CBC & Differential; Future    6. Hyperglycemia  Assessment & Plan:  A1c 6.4.  Recheck in 6 months.    Orders:  -     LORazepam (ATIVAN) 1 MG tablet; Take 1  tablet by mouth Every Night.  Dispense: 30 tablet; Refill: 2  -     Hemoglobin A1c; Future  -     Vitamin B12; Future  -     Vitamin D,25-Hydroxy; Future  -     Lipid Panel; Future  -     Comprehensive Metabolic Panel; Future  -     TSH Rfx On Abnormal To Free T4; Future  -     CBC & Differential; Future    7. Vitamin D deficiency  Assessment & Plan:  Vitamin D is 51.3.    Orders:  -     LORazepam (ATIVAN) 1 MG tablet; Take 1 tablet by mouth Every Night.  Dispense: 30 tablet; Refill: 2  -     Hemoglobin A1c; Future  -     Vitamin B12; Future  -     Vitamin D,25-Hydroxy; Future  -     Lipid Panel; Future  -     Comprehensive Metabolic Panel; Future  -     TSH Rfx On Abnormal To Free T4; Future  -     CBC & Differential; Future    8. Hypercalcemia  Assessment & Plan:  Has no kidney stones, or complications at this time.  She does not want treatment for her hyperparathyroidism.    Orders:  -     LORazepam (ATIVAN) 1 MG tablet; Take 1 tablet by mouth Every Night.  Dispense: 30 tablet; Refill: 2  -     Hemoglobin A1c; Future  -     Vitamin B12; Future  -     Vitamin D,25-Hydroxy; Future  -     Lipid Panel; Future  -     Comprehensive Metabolic Panel; Future  -     TSH Rfx On Abnormal To Free T4; Future  -     CBC & Differential; Future             Follow Up:   No follow-ups on file.      Janak Leiva MD  [facility] [place] [facility]     Answers submitted by the patient for this visit:  Other (Submitted on 5/22/2024)  Please describe your symptoms.: Prescription refills  Have you had these symptoms before?: No  How long have you been having these symptoms?: 1-4 days  Primary Reason for Visit (Submitted on 5/22/2024)  What is the primary reason for your visit?: Other

## 2024-05-29 NOTE — ASSESSMENT & PLAN NOTE
HDL 61.  LDL 85.  Triglycerides 164.  Recheck in 6 months.   Please follow up with your Doctor after your discharge from [facility] (2 weeks, call for appointment).  PT-non weight bearing left lower extremity in knee immobilizer.  AFO brace for foot drop.  Keep dressing clean, dry and intact.  Please take Eliquis twice daily.  You will require a follow up with your PMD to manage your DVT and anticoagulation.  Please call  your doctor to make an appointment.  Have doctor remove any staples/sutures postop day 14 (if applicable), and apply steristrips as needed.   Please follow up with your Doctor after your discharge from [facility] (2 weeks, call for appointment).  PT-non weight bearing left lower extremity in knee immobilizer.  AFO brace for foot drop.  Keep dressing clean, dry and intact.  Please take Eliquis 10mg twice daily until 6/11/22, then 6/12/22 start 5mg twice daily.  You will require a follow up with your PMD to manage your DVT and anticoagulation.  Please call  your doctor to make an appointment.  Have doctor remove any staples/sutures postop day 14 (if applicable), and apply steristrips as needed.

## 2024-07-14 DIAGNOSIS — I10 HYPERTENSION, ESSENTIAL: ICD-10-CM

## 2024-07-15 RX ORDER — AMLODIPINE BESYLATE 5 MG/1
5 TABLET ORAL DAILY
Qty: 90 TABLET | Refills: 1 | Status: SHIPPED | OUTPATIENT
Start: 2024-07-15

## 2024-08-02 DIAGNOSIS — E78.2 HYPERLIPIDEMIA, MIXED: ICD-10-CM

## 2024-08-02 DIAGNOSIS — F51.01 PRIMARY INSOMNIA: ICD-10-CM

## 2024-08-02 RX ORDER — TRAZODONE HYDROCHLORIDE 50 MG/1
50 TABLET ORAL
Qty: 90 TABLET | Refills: 0 | Status: SHIPPED | OUTPATIENT
Start: 2024-08-02

## 2024-08-02 RX ORDER — SIMVASTATIN 20 MG
20 TABLET ORAL NIGHTLY
Qty: 90 TABLET | Refills: 0 | Status: SHIPPED | OUTPATIENT
Start: 2024-08-02

## 2024-08-02 NOTE — TELEPHONE ENCOUNTER
Rx Refill Note    Requested Prescriptions     Pending Prescriptions Disp Refills    simvastatin (ZOCOR) 20 MG tablet [Pharmacy Med Name: SIMVASTATIN 20 MG TABLET] 90 tablet 0     Sig: TAKE ONE TABLET BY MOUTH ONCE NIGHTLY    traZODone (DESYREL) 50 MG tablet [Pharmacy Med Name: TRAZODONE 50 MG TABLET] 90 tablet 0     Sig: TAKE 1 TABLET BY MOUTH EVERY NIGHT AT BEDTIME        Last office visit with prescribing clinician: 5/29/2024      Next office visit with prescribing clinician: Visit date not found   Last labs:   Last refill: needs   Pharmacy (be sure to add in Epic). correct

## 2024-08-14 DIAGNOSIS — E55.9 VITAMIN D DEFICIENCY: ICD-10-CM

## 2024-08-14 RX ORDER — OMEGA-3S/DHA/EPA/FISH OIL/D3 300MG-1000
400 CAPSULE ORAL DAILY
Qty: 90 TABLET | Refills: 0 | Status: SHIPPED | OUTPATIENT
Start: 2024-08-14

## 2024-10-31 DIAGNOSIS — E78.2 HYPERLIPIDEMIA, MIXED: ICD-10-CM

## 2024-10-31 DIAGNOSIS — F51.01 PRIMARY INSOMNIA: ICD-10-CM

## 2024-10-31 RX ORDER — TRAZODONE HYDROCHLORIDE 50 MG/1
50 TABLET, FILM COATED ORAL
Qty: 90 TABLET | Refills: 0 | Status: SHIPPED | OUTPATIENT
Start: 2024-10-31

## 2024-10-31 RX ORDER — SIMVASTATIN 20 MG
20 TABLET ORAL NIGHTLY
Qty: 90 TABLET | Refills: 0 | Status: SHIPPED | OUTPATIENT
Start: 2024-10-31

## 2024-11-01 ENCOUNTER — OFFICE VISIT (OUTPATIENT)
Dept: FAMILY MEDICINE CLINIC | Facility: CLINIC | Age: 64
End: 2024-11-01
Payer: MEDICARE

## 2024-11-01 VITALS
DIASTOLIC BLOOD PRESSURE: 88 MMHG | BODY MASS INDEX: 26.98 KG/M2 | OXYGEN SATURATION: 98 % | HEIGHT: 62 IN | SYSTOLIC BLOOD PRESSURE: 128 MMHG | WEIGHT: 146.6 LBS | HEART RATE: 78 BPM

## 2024-11-01 DIAGNOSIS — E78.2 HYPERLIPIDEMIA, MIXED: ICD-10-CM

## 2024-11-01 DIAGNOSIS — E83.52 HYPERCALCEMIA: ICD-10-CM

## 2024-11-01 DIAGNOSIS — F41.9 ANXIETY: ICD-10-CM

## 2024-11-01 DIAGNOSIS — I10 HYPERTENSION, ESSENTIAL: ICD-10-CM

## 2024-11-01 DIAGNOSIS — F51.01 PRIMARY INSOMNIA: ICD-10-CM

## 2024-11-01 DIAGNOSIS — E55.9 VITAMIN D DEFICIENCY: ICD-10-CM

## 2024-11-01 DIAGNOSIS — Z00.00 PHYSICAL EXAM: ICD-10-CM

## 2024-11-01 DIAGNOSIS — E03.9 ACQUIRED HYPOTHYROIDISM: ICD-10-CM

## 2024-11-01 DIAGNOSIS — Z00.00 MEDICARE ANNUAL WELLNESS VISIT, SUBSEQUENT: Primary | ICD-10-CM

## 2024-11-01 DIAGNOSIS — R73.9 HYPERGLYCEMIA: ICD-10-CM

## 2024-11-01 RX ORDER — LORAZEPAM 1 MG/1
1 TABLET ORAL NIGHTLY
Qty: 30 TABLET | Refills: 2 | Status: SHIPPED | OUTPATIENT
Start: 2024-11-01

## 2024-11-01 NOTE — ASSESSMENT & PLAN NOTE
Patient is hyperparathyroid.Has no kidney stones, or complications at this time.  She does not want treatment for her hyperparathyroidism.

## 2024-11-01 NOTE — PROGRESS NOTES
Subjective   The ABCs of the Annual Wellness Visit  Medicare Wellness Visit      Julie B Sipple is a 64 y.o. patient who presents for a Medicare Wellness Visit.    The following portions of the patient's history were reviewed and   updated as appropriate: allergies, current medications, past family history, past medical history, past social history, past surgical history, and problem list.    Compared to one year ago, the patient's physical   health is the same.  Compared to one year ago, the patient's mental   health is the same.    Recent Hospitalizations:  She was not admitted to the hospital during the last year.     Current Medical Providers:  Patient Care Team:  Janak Leiva MD as PCP - General (Family Medicine)    Outpatient Medications Prior to Visit   Medication Sig Dispense Refill    amLODIPine (NORVASC) 5 MG tablet TAKE 1 TABLET BY MOUTH DAILY 90 tablet 1    cholecalciferol (VITAMIN D3) 10 MCG (400 UNIT) tablet TAKE 1 TABLET BY MOUTH DAILY 90 tablet 0    simvastatin (ZOCOR) 20 MG tablet TAKE ONE TABLET BY MOUTH ONCE NIGHTLY 90 tablet 0    traZODone (DESYREL) 50 MG tablet TAKE 1 TABLET BY MOUTH EVERY NIGHT AT BEDTIME 90 tablet 0    LORazepam (ATIVAN) 1 MG tablet Take 1 tablet by mouth Every Night. 30 tablet 2     No facility-administered medications prior to visit.     No opioid medication identified on active medication list. I have reviewed chart for other potential  high risk medication/s and harmful drug interactions in the elderly.      Aspirin is not on active medication list.  Aspirin use is not indicated based on review of current medical condition/s. Risk of harm outweighs potential benefits.  .    Patient Active Problem List   Diagnosis    Physical exam    Hyperglycemia    Hypercalcemia    Hypertension, essential    Hyperlipidemia, mixed    Acquired hypothyroidism    Primary insomnia    Vitamin D deficiency    Anxiety    Fear of flying    Prediabetes    Morbid (severe) obesity due to  "excess calories    Hyperparathyroidism     Advance Care Planning Advance Directive is not on file.  ACP discussion was held with the patient during this visit. Patient does not have an advance directive, information provided.            Objective   Vitals:    24 1339   BP: 128/88   BP Location: Left arm   Patient Position: Sitting   Cuff Size: Adult   Pulse: 78   SpO2: 98%   Weight: 66.5 kg (146 lb 9.6 oz)   Height: 157.3 cm (61.93\")   PainSc: 0-No pain       Estimated body mass index is 26.88 kg/m² as calculated from the following:    Height as of this encounter: 157.3 cm (61.93\").    Weight as of this encounter: 66.5 kg (146 lb 9.6 oz).    BMI is >= 25 and <30. (Overweight) The following options were offered after discussion;: weight loss educational material (shared in after visit summary), exercise counseling/recommendations, and nutrition counseling/recommendations       Does the patient have evidence of cognitive impairment? No                                                                                                Health  Risk Assessment    Smoking Status:  Social History     Tobacco Use   Smoking Status Former    Current packs/day: 0.00    Average packs/day: 1 pack/day for 4.0 years (4.0 ttl pk-yrs)    Types: Cigarettes    Start date:     Quit date:     Years since quittin.8   Smokeless Tobacco Never     Alcohol Consumption:  Social History     Substance and Sexual Activity   Alcohol Use Never       Fall Risk Screen  STEADI Fall Risk Assessment was completed, and patient is at LOW risk for falls.Assessment completed on:2024    Depression Screenin/1/2024     1:42 PM   PHQ-2/PHQ-9 Depression Screening   Little interest or pleasure in doing things Not at all   Feeling down, depressed, or hopeless Not at all     Health Habits and Functional and Cognitive Screenin/1/2024     1:42 PM   Functional & Cognitive Status   Do you have difficulty preparing food and " eating? No   Do you have difficulty bathing yourself, getting dressed or grooming yourself? No   Do you have difficulty using the toilet? No   Do you have difficulty moving around from place to place? No   Do you have trouble with steps or getting out of a bed or a chair? No   Current Diet Well Balanced Diet   Dental Exam Up to date   Eye Exam Up to date   Exercise (times per week) 0 times per week   Current Exercises Include Walking;House Cleaning;Yard Work;Gardening   Do you need help using the phone?  No   Are you deaf or do you have serious difficulty hearing?  No   Do you need help to go to places out of walking distance? No   Do you need help shopping? No   Do you need help preparing meals?  No   Do you need help with housework?  No   Do you need help with laundry? No   Do you need help taking your medications? No   Do you need help managing money? No   Do you ever drive or ride in a car without wearing a seat belt? No   Have you felt unusual stress, anger or loneliness in the last month? No   Who do you live with? Spouse   If you need help, do you have trouble finding someone available to you? No   Do you have difficulty concentrating, remembering or making decisions? No           Age-appropriate Screening Schedule:  Refer to the list below for future screening recommendations based on patient's age, sex and/or medical conditions. Orders for these recommended tests are listed in the plan section. The patient has been provided with a written plan.    Health Maintenance List  Health Maintenance   Topic Date Due    DXA SCAN  11/01/2024 (Originally 1960)    LIPID PANEL  05/13/2025    MAMMOGRAM  08/17/2025    ANNUAL WELLNESS VISIT  11/01/2025    BMI FOLLOWUP  11/01/2025    COLORECTAL CANCER SCREENING  01/22/2027    TDAP/TD VACCINES (3 - Td or Tdap) 03/01/2033    HEPATITIS C SCREENING  Completed    COVID-19 Vaccine  Completed    INFLUENZA VACCINE  Completed    Pneumococcal Vaccine 65+  Completed    ZOSTER  "VACCINE  Completed                                                                                                                                                CMS Preventative Services Quick Reference  Risk Factors Identified During Encounter  None Identified    The above risks/problems have been discussed with the patient.  Pertinent information has been shared with the patient in the After Visit Summary.  An After Visit Summary and PPPS were made available to the patient.    Follow Up:   Next Medicare Wellness visit to be scheduled in 1 year.         Additional E&M Note during same encounter follows:  Patient has additional, significant, and separately identifiable condition(s)/problem(s) that require work above and beyond the Medicare Wellness Visit     Chief Complaint  Med Refill and Medicare Wellness-subsequent    Subjective   HPI  Shahrzad is also being seen today for an annual adult preventative physical exam.  and Shahrzad is also being seen today for additional medical problem/s.    Review of Systems   Constitutional: Negative.    HENT: Negative.     Eyes: Negative.    Respiratory: Negative.     Cardiovascular: Negative.    Gastrointestinal: Negative.    Neurological: Negative.               Objective   Vital Signs:  /88 (BP Location: Left arm, Patient Position: Sitting, Cuff Size: Adult)   Pulse 78   Ht 157.3 cm (61.93\")   Wt 66.5 kg (146 lb 9.6 oz)   SpO2 98%   BMI 26.88 kg/m²   Physical Exam  Vitals and nursing note reviewed.   Constitutional:       Appearance: Normal appearance. She is normal weight.   HENT:      Head: Normocephalic and atraumatic.      Right Ear: Tympanic membrane, ear canal and external ear normal.      Left Ear: Tympanic membrane, ear canal and external ear normal.      Nose: Nose normal.      Mouth/Throat:      Mouth: Mucous membranes are moist.      Pharynx: Oropharynx is clear.   Eyes:      Extraocular Movements: Extraocular movements intact.      Conjunctiva/sclera: " Conjunctivae normal.      Pupils: Pupils are equal, round, and reactive to light.   Cardiovascular:      Rate and Rhythm: Normal rate and regular rhythm.      Pulses: Normal pulses.      Heart sounds: Normal heart sounds.   Pulmonary:      Effort: Pulmonary effort is normal.      Breath sounds: Normal breath sounds.   Abdominal:      General: Abdomen is flat. Bowel sounds are normal.      Palpations: Abdomen is soft.   Musculoskeletal:         General: Normal range of motion.      Cervical back: Normal range of motion and neck supple.   Feet:      Comments:      Skin:     General: Skin is warm and dry.   Neurological:      General: No focal deficit present.      Mental Status: She is alert and oriented to person, place, and time.   Psychiatric:         Mood and Affect: Mood normal.         Behavior: Behavior normal.         Thought Content: Thought content normal.         Judgment: Judgment normal.         The following data was reviewed by: Janak Leiva MD on 11/01/2024:        Assessment and Plan Additional age appropriate preventative wellness advice topics were discussed during today's preventative wellness exam(some topics already addressed during AWV portion of the note above):    Physical Activity: Advised cardiovascular activity 150 minutes per week as tolerated. (example brisk walk for 30 minutes, 5 days a week).     Nutrition: Discussed nutrition plan with patient. Information shared in after visit summary. Goal is for a well balanced diet to enhance overall health.     Healthy Weight: Discussed current and goal BMI with patient. Steps to attain this goal discussed. Information shared in after visit summary.              Primary insomnia    Anxiety  Refill Ativan.  House bill 1  Hypertension, essential  Discussed with patient to monitor their blood pressure and if systolic blood pressure goes above 140 or diastolic is above 90 to return to clinic.  Take medicines as directed, call for any problems,  patient not having or any worrisome symptoms.      Hyperlipidemia, mixed  Blood work in 3 months  Hyperglycemia  Work in 3 months  Vitamin D deficiency  Blood work in 3 months  Hypercalcemia  Patient is hyperparathyroid.Has no kidney stones, or complications at this time.  She does not want treatment for her hyperparathyroidism.  Medicare annual wellness visit, subsequent  Discussed health maintenance, diet, exercise, and immunizations.  Physical exam  Discussed health maintenance, diet, exercise, and immunizations.  Acquired hypothyroidism  Blood work in 3 months    Orders Placed This Encounter   Procedures    Hemoglobin A1c     Standing Status:   Future     Standing Expiration Date:   11/1/2025     Order Specific Question:   Release to patient     Answer:   Routine Release [[id number]]    Lipid Panel     Standing Status:   Future     Standing Expiration Date:   11/1/2025     Order Specific Question:   Release to patient     Answer:   Routine Release [[id number]]    Comprehensive Metabolic Panel     Standing Status:   Future     Standing Expiration Date:   11/1/2025     Order Specific Question:   Release to patient     Answer:   Routine Release [[id number]]    Vitamin B12     Standing Status:   Future     Standing Expiration Date:   11/1/2025     Order Specific Question:   Release to patient     Answer:   Routine Release [[id number]]    Vitamin D,25-Hydroxy     Standing Status:   Future     Standing Expiration Date:   11/1/2025     Order Specific Question:   Release to patient     Answer:   Routine Release [[id number]]    TSH Rfx On Abnormal To Free T4     Standing Status:   Future     Standing Expiration Date:   11/1/2025     Order Specific Question:   Release to patient     Answer:   Routine Release [[id number]]    CBC & Differential     Standing Status:   Future     Standing Expiration Date:   11/1/2025     Order Specific Question:   Manual Differential     Answer:   No     Order Specific Question:   Release to  patient     Answer:   Routine Release [[id number]]     New Medications Ordered This Visit   Medications    LORazepam (ATIVAN) 1 MG tablet     Sig: Take 1 tablet by mouth Every Night.     Dispense:  30 tablet     Refill:  2        I spent 10 minutes caring for Shahrzad on this date of service. This time includes time spent by me in the following activities:preparing for the visit, reviewing tests, obtaining and/or reviewing a separately obtained history, performing a medically appropriate examination and/or evaluation , counseling and educating the patient/family/caregiver, ordering medications, tests, or procedures, documenting information in the medical record, independently interpreting results and communicating that information with the patient/family/caregiver, and care coordination  Follow Up   No follow-ups on file.  Patient was given instructions and counseling regarding her condition or for health maintenance advice. Please see specific information pulled into the AVS if appropriate.  Answers submitted by the patient for this visit:  Other (Submitted on 11/1/2024)  Please describe your symptoms.: No symptoms.   Just refills on medicine.  Have you had these symptoms before?: No  How long have you been having these symptoms?: 1-4 days  Primary Reason for Visit (Submitted on 11/1/2024)  What is the primary reason for your visit?: Problem Not Listed

## 2025-01-12 DIAGNOSIS — I10 HYPERTENSION, ESSENTIAL: ICD-10-CM

## 2025-01-14 RX ORDER — AMLODIPINE BESYLATE 5 MG/1
5 TABLET ORAL DAILY
Qty: 90 TABLET | Refills: 1 | Status: SHIPPED | OUTPATIENT
Start: 2025-01-14

## 2025-01-27 DIAGNOSIS — F51.01 PRIMARY INSOMNIA: ICD-10-CM

## 2025-01-27 DIAGNOSIS — E78.2 HYPERLIPIDEMIA, MIXED: ICD-10-CM

## 2025-01-27 RX ORDER — TRAZODONE HYDROCHLORIDE 50 MG/1
50 TABLET, FILM COATED ORAL
Qty: 90 TABLET | Refills: 0 | Status: SHIPPED | OUTPATIENT
Start: 2025-01-27

## 2025-01-27 RX ORDER — SIMVASTATIN 20 MG
20 TABLET ORAL NIGHTLY
Qty: 90 TABLET | Refills: 1 | Status: SHIPPED | OUTPATIENT
Start: 2025-01-27

## 2025-03-03 ENCOUNTER — LAB (OUTPATIENT)
Dept: FAMILY MEDICINE CLINIC | Facility: CLINIC | Age: 65
End: 2025-03-03
Payer: MEDICARE

## 2025-03-03 DIAGNOSIS — E03.9 ACQUIRED HYPOTHYROIDISM: ICD-10-CM

## 2025-03-03 DIAGNOSIS — Z00.00 PHYSICAL EXAM: ICD-10-CM

## 2025-03-03 DIAGNOSIS — I10 HYPERTENSION, ESSENTIAL: ICD-10-CM

## 2025-03-03 DIAGNOSIS — F51.01 PRIMARY INSOMNIA: ICD-10-CM

## 2025-03-03 DIAGNOSIS — E83.52 HYPERCALCEMIA: ICD-10-CM

## 2025-03-03 DIAGNOSIS — R73.9 HYPERGLYCEMIA: ICD-10-CM

## 2025-03-03 DIAGNOSIS — E55.9 VITAMIN D DEFICIENCY: ICD-10-CM

## 2025-03-03 DIAGNOSIS — F41.9 ANXIETY: ICD-10-CM

## 2025-03-03 DIAGNOSIS — E78.2 HYPERLIPIDEMIA, MIXED: ICD-10-CM

## 2025-03-03 DIAGNOSIS — Z00.00 MEDICARE ANNUAL WELLNESS VISIT, SUBSEQUENT: ICD-10-CM

## 2025-03-04 LAB
25(OH)D3+25(OH)D2 SERPL-MCNC: 77 NG/ML (ref 30–100)
ALBUMIN SERPL-MCNC: 4.6 G/DL (ref 3.9–4.9)
ALP SERPL-CCNC: 97 IU/L (ref 44–121)
ALT SERPL-CCNC: 21 IU/L (ref 0–32)
AST SERPL-CCNC: 21 IU/L (ref 0–40)
BASOPHILS # BLD AUTO: 0 X10E3/UL (ref 0–0.2)
BASOPHILS NFR BLD AUTO: 1 %
BILIRUB SERPL-MCNC: 0.4 MG/DL (ref 0–1.2)
BUN SERPL-MCNC: 17 MG/DL (ref 8–27)
BUN/CREAT SERPL: 20 (ref 12–28)
CALCIUM SERPL-MCNC: 10.9 MG/DL (ref 8.7–10.3)
CHLORIDE SERPL-SCNC: 106 MMOL/L (ref 96–106)
CHOLEST SERPL-MCNC: 170 MG/DL (ref 100–199)
CO2 SERPL-SCNC: 21 MMOL/L (ref 20–29)
CREAT SERPL-MCNC: 0.84 MG/DL (ref 0.57–1)
EGFRCR SERPLBLD CKD-EPI 2021: 76 ML/MIN/1.73
EOSINOPHIL # BLD AUTO: 0.1 X10E3/UL (ref 0–0.4)
EOSINOPHIL NFR BLD AUTO: 2 %
ERYTHROCYTE [DISTWIDTH] IN BLOOD BY AUTOMATED COUNT: 12.4 % (ref 11.7–15.4)
GLOBULIN SER CALC-MCNC: 2 G/DL (ref 1.5–4.5)
GLUCOSE SERPL-MCNC: 104 MG/DL (ref 70–99)
HBA1C MFR BLD: 6 % (ref 4.8–5.6)
HCT VFR BLD AUTO: 43.4 % (ref 34–46.6)
HDLC SERPL-MCNC: 61 MG/DL
HGB BLD-MCNC: 14 G/DL (ref 11.1–15.9)
IMM GRANULOCYTES # BLD AUTO: 0 X10E3/UL (ref 0–0.1)
IMM GRANULOCYTES NFR BLD AUTO: 0 %
LDLC SERPL CALC-MCNC: 89 MG/DL (ref 0–99)
LYMPHOCYTES # BLD AUTO: 2.2 X10E3/UL (ref 0.7–3.1)
LYMPHOCYTES NFR BLD AUTO: 37 %
MCH RBC QN AUTO: 28.4 PG (ref 26.6–33)
MCHC RBC AUTO-ENTMCNC: 32.3 G/DL (ref 31.5–35.7)
MCV RBC AUTO: 88 FL (ref 79–97)
MONOCYTES # BLD AUTO: 0.4 X10E3/UL (ref 0.1–0.9)
MONOCYTES NFR BLD AUTO: 7 %
NEUTROPHILS # BLD AUTO: 3 X10E3/UL (ref 1.4–7)
NEUTROPHILS NFR BLD AUTO: 53 %
PLATELET # BLD AUTO: 215 X10E3/UL (ref 150–450)
POTASSIUM SERPL-SCNC: 4.2 MMOL/L (ref 3.5–5.2)
PROT SERPL-MCNC: 6.6 G/DL (ref 6–8.5)
RBC # BLD AUTO: 4.93 X10E6/UL (ref 3.77–5.28)
SODIUM SERPL-SCNC: 141 MMOL/L (ref 134–144)
TRIGL SERPL-MCNC: 110 MG/DL (ref 0–149)
TSH SERPL DL<=0.005 MIU/L-ACNC: 2.28 UIU/ML (ref 0.45–4.5)
VIT B12 SERPL-MCNC: 240 PG/ML (ref 232–1245)
VLDLC SERPL CALC-MCNC: 20 MG/DL (ref 5–40)
WBC # BLD AUTO: 5.8 X10E3/UL (ref 3.4–10.8)

## 2025-03-07 ENCOUNTER — OFFICE VISIT (OUTPATIENT)
Dept: FAMILY MEDICINE CLINIC | Facility: CLINIC | Age: 65
End: 2025-03-07
Payer: MEDICARE

## 2025-03-07 VITALS
OXYGEN SATURATION: 98 % | BODY MASS INDEX: 26.89 KG/M2 | SYSTOLIC BLOOD PRESSURE: 134 MMHG | HEART RATE: 79 BPM | RESPIRATION RATE: 16 BRPM | DIASTOLIC BLOOD PRESSURE: 94 MMHG | WEIGHT: 146.1 LBS | HEIGHT: 62 IN

## 2025-03-07 DIAGNOSIS — F41.9 ANXIETY: ICD-10-CM

## 2025-03-07 DIAGNOSIS — I10 HYPERTENSION, ESSENTIAL: Primary | ICD-10-CM

## 2025-03-07 DIAGNOSIS — R73.9 HYPERGLYCEMIA: ICD-10-CM

## 2025-03-07 DIAGNOSIS — E66.01 MORBID (SEVERE) OBESITY DUE TO EXCESS CALORIES: ICD-10-CM

## 2025-03-07 DIAGNOSIS — E55.9 VITAMIN D DEFICIENCY: ICD-10-CM

## 2025-03-07 DIAGNOSIS — F51.01 PRIMARY INSOMNIA: ICD-10-CM

## 2025-03-07 DIAGNOSIS — E21.3 HYPERPARATHYROIDISM: ICD-10-CM

## 2025-03-07 DIAGNOSIS — E83.52 HYPERCALCEMIA: ICD-10-CM

## 2025-03-07 DIAGNOSIS — E78.2 HYPERLIPIDEMIA, MIXED: ICD-10-CM

## 2025-03-07 DIAGNOSIS — E03.9 ACQUIRED HYPOTHYROIDISM: ICD-10-CM

## 2025-03-07 RX ORDER — TRAZODONE HYDROCHLORIDE 50 MG/1
50 TABLET ORAL
Qty: 90 TABLET | Refills: 1 | Status: SHIPPED | OUTPATIENT
Start: 2025-03-07

## 2025-03-07 RX ORDER — SIMVASTATIN 20 MG
20 TABLET ORAL NIGHTLY
Qty: 90 TABLET | Refills: 1 | Status: SHIPPED | OUTPATIENT
Start: 2025-03-07

## 2025-03-07 RX ORDER — LORAZEPAM 1 MG/1
1 TABLET ORAL NIGHTLY
Qty: 30 TABLET | Refills: 2 | Status: SHIPPED | OUTPATIENT
Start: 2025-03-07

## 2025-03-07 RX ORDER — AMLODIPINE BESYLATE 5 MG/1
5 TABLET ORAL DAILY
Qty: 90 TABLET | Refills: 1 | Status: SHIPPED | OUTPATIENT
Start: 2025-03-07

## 2025-03-07 NOTE — ASSESSMENT & PLAN NOTE
Patient's (There is no height or weight on file to calculate BMI.) indicates that they are obese (BMI >30) with health conditions that include impaired fasting glucose . Weight is unchanged. BMI  is above average; BMI management plan is completed. We discussed portion control and increasing exercise.

## 2025-03-07 NOTE — ASSESSMENT & PLAN NOTE
Patient has been seen at .  Her calciums come down to 10.9.  She has not had any stones.  Recheck in 6 months.

## 2025-03-07 NOTE — PROGRESS NOTES
Patient Name: Julie B Sipple  : 1960   MRN: [id number]     Chief Complaint:    Chief Complaint   Patient presents with    Hypertension    Hyperlipidemia    Anxiety       History of Present Illness: Julie B Sipple is a 65 y.o. female who is here today for follow up on calcium, blood sugar, cholesterol, vitamin D.  HPI        Review of Systems:   Review of Systems   Constitutional: Negative.  Negative for chills, diaphoresis, fatigue and fever.   HENT: Negative.  Negative for congestion and sore throat.    Eyes: Negative.    Respiratory: Negative.  Negative for cough.    Cardiovascular: Negative.  Negative for chest pain.   Gastrointestinal: Negative.  Negative for abdominal pain, nausea and vomiting.   Genitourinary:  Negative for dysuria.   Musculoskeletal:  Negative for myalgias and neck pain.   Skin:  Negative for rash.   Neurological: Negative.  Negative for weakness, numbness and headaches.        Past Medical History:   Past Medical History:   Diagnosis Date    Anxiety     Fear of flying     Mixed hyperlipidemia     Prediabetes        Past Surgical History:   Past Surgical History:   Procedure Laterality Date    TUBAL ABDOMINAL LIGATION Bilateral        Family History:   Family History   Problem Relation Age of Onset    Hypertension Mother     Breast cancer Mother 42    Hypertension Sister     Stroke Maternal Grandmother 65    Other Daughter         BLADDER DISEASE       Social History:   Social History     Socioeconomic History    Marital status:    Tobacco Use    Smoking status: Former     Current packs/day: 0.00     Average packs/day: 1 pack/day for 4.0 years (4.0 ttl pk-yrs)     Types: Cigarettes     Start date:      Quit date:      Years since quittin.2    Smokeless tobacco: Never   Vaping Use    Vaping status: Never Used   Substance and Sexual Activity    Alcohol use: Never    Drug use: Never    Sexual activity: Defer       Medications:     Current Outpatient Medications:  "    amLODIPine (NORVASC) 5 MG tablet, Take 1 tablet by mouth Daily., Disp: 90 tablet, Rfl: 1    cholecalciferol (VITAMIN D3) 10 MCG (400 UNIT) tablet, TAKE 1 TABLET BY MOUTH DAILY, Disp: 90 tablet, Rfl: 0    LORazepam (ATIVAN) 1 MG tablet, Take 1 tablet by mouth Every Night., Disp: 30 tablet, Rfl: 2    simvastatin (ZOCOR) 20 MG tablet, Take 1 tablet by mouth Every Night., Disp: 90 tablet, Rfl: 1    traZODone (DESYREL) 50 MG tablet, Take 1 tablet by mouth every night at bedtime., Disp: 90 tablet, Rfl: 1    Allergies:   No Known Allergies      Physical Exam:  Vital Signs:   Vitals:    03/07/25 1029   BP: 134/94   BP Location: Left arm   Patient Position: Sitting   Cuff Size: Adult   Pulse: 79   Resp: 16   SpO2: 98%   Weight: 66.3 kg (146 lb 1.6 oz)   Height: 157.3 cm (61.93\")   PainSc: 0-No pain     Body mass index is 26.78 kg/m².     Physical Exam  Vitals and nursing note reviewed.   Constitutional:       Appearance: Normal appearance. She is normal weight.   HENT:      Head: Normocephalic and atraumatic.      Right Ear: Tympanic membrane, ear canal and external ear normal.      Left Ear: Tympanic membrane, ear canal and external ear normal.      Nose: Nose normal.      Mouth/Throat:      Mouth: Mucous membranes are dry.      Pharynx: Oropharynx is clear.   Eyes:      Extraocular Movements: Extraocular movements intact.      Conjunctiva/sclera: Conjunctivae normal.      Pupils: Pupils are equal, round, and reactive to light.   Cardiovascular:      Rate and Rhythm: Normal rate and regular rhythm.      Pulses: Normal pulses.      Heart sounds: Normal heart sounds.   Pulmonary:      Effort: Pulmonary effort is normal.      Breath sounds: Normal breath sounds.   Musculoskeletal:      Cervical back: Normal range of motion and neck supple.   Feet:      Comments:      Neurological:      Mental Status: She is alert.         Procedures      Assessment/Plan:   Diagnoses and all orders for this visit:    1. Hypertension, " essential (Primary)  Assessment & Plan:  Discussed with patient to monitor their blood pressure and if systolic blood pressure goes above 140 or diastolic is above 90 to return to clinic.  Take medicines as directed, call for any problems, patient not having or any worrisome symptoms.        Orders:  -     amLODIPine (NORVASC) 5 MG tablet; Take 1 tablet by mouth Daily.  Dispense: 90 tablet; Refill: 1  -     LORazepam (ATIVAN) 1 MG tablet; Take 1 tablet by mouth Every Night.  Dispense: 30 tablet; Refill: 2    2. Hyperlipidemia, mixed  Assessment & Plan:  HDL 61.  LDL 89.  Triglycerides 110.  Recheck in 6 months.    Orders:  -     LORazepam (ATIVAN) 1 MG tablet; Take 1 tablet by mouth Every Night.  Dispense: 30 tablet; Refill: 2  -     simvastatin (ZOCOR) 20 MG tablet; Take 1 tablet by mouth Every Night.  Dispense: 90 tablet; Refill: 1    3. Acquired hypothyroidism  Assessment & Plan:  TSH 2.280.  Recheck in 6 months.      4. Hyperglycemia  Assessment & Plan:  A1c is 6.0.  Recheck in 6 months.    Orders:  -     LORazepam (ATIVAN) 1 MG tablet; Take 1 tablet by mouth Every Night.  Dispense: 30 tablet; Refill: 2    5. Hyperparathyroidism  Assessment & Plan:  Patient has been seen at .  Her calciums come down to 10.9.  She has not had any stones.  Recheck in 6 months.      6. Morbid (severe) obesity due to excess calories  Assessment & Plan:  Patient's (There is no height or weight on file to calculate BMI.) indicates that they are obese (BMI >30) with health conditions that include impaired fasting glucose . Weight is unchanged. BMI  is above average; BMI management plan is completed. We discussed portion control and increasing exercise.       7. Vitamin D deficiency  Assessment & Plan:  Vitamin D is 77.0.  Recheck in 6 months.    Orders:  -     LORazepam (ATIVAN) 1 MG tablet; Take 1 tablet by mouth Every Night.  Dispense: 30 tablet; Refill: 2    8. Hypercalcemia  Assessment & Plan:  Patient is hyperparathyroid.Has no  kidney stones, or complications at this time.  She does not want treatment for her hyperparathyroidism.    Orders:  -     LORazepam (ATIVAN) 1 MG tablet; Take 1 tablet by mouth Every Night.  Dispense: 30 tablet; Refill: 2    9. Primary insomnia  Assessment & Plan:  Taking trazodone and Ativan.    Orders:  -     LORazepam (ATIVAN) 1 MG tablet; Take 1 tablet by mouth Every Night.  Dispense: 30 tablet; Refill: 2  -     traZODone (DESYREL) 50 MG tablet; Take 1 tablet by mouth every night at bedtime.  Dispense: 90 tablet; Refill: 1    10. Anxiety  -     LORazepam (ATIVAN) 1 MG tablet; Take 1 tablet by mouth Every Night.  Dispense: 30 tablet; Refill: 2  -     Measles / Mumps / Rubella Immunity; Future             Follow Up:   Return in about 6 months (around 9/7/2025) for Annual physical, Bloodwork 1 week prior to next appointment.      Janak Leiva MD  [facility]

## 2025-03-08 LAB
MEV IGG SER IA-ACNC: >300 AU/ML
MUV IGG SER IA-ACNC: 23.7 AU/ML
RUBV IGG SERPL IA-ACNC: 6.5 INDEX

## 2025-07-02 ENCOUNTER — RESULTS FOLLOW-UP (OUTPATIENT)
Dept: FAMILY MEDICINE CLINIC | Facility: CLINIC | Age: 65
End: 2025-07-02
Payer: MEDICARE

## 2025-07-21 NOTE — PROGRESS NOTES
Patient Name: Julie B Sipple  : 1960   MRN: [id number]     Chief Complaint:    Chief Complaint   Patient presents with    Hypertension    Hyperlipidemia       History of Present Illness: Julie B Sipple is a 65 y.o. female who is here today for follow up on anxiety and hypertension.  HPI        Review of Systems:   Review of Systems   Constitutional: Negative.  Negative for chills, diaphoresis, fatigue and fever.   HENT: Negative.  Negative for congestion and sore throat.    Eyes: Negative.    Respiratory: Negative.  Negative for cough.    Cardiovascular: Negative.  Negative for chest pain.   Gastrointestinal: Negative.  Negative for abdominal pain, nausea and vomiting.   Genitourinary:  Negative for dysuria.   Musculoskeletal:  Negative for myalgias and neck pain.   Skin:  Negative for rash.   Neurological: Negative.  Negative for weakness, numbness and headaches.        Past Medical History:   Past Medical History:   Diagnosis Date    Anxiety     Fear of flying     Mixed hyperlipidemia     Prediabetes        Past Surgical History:   Past Surgical History:   Procedure Laterality Date    TUBAL ABDOMINAL LIGATION Bilateral        Family History:   Family History   Problem Relation Age of Onset    Hypertension Mother     Breast cancer Mother 42    Hypertension Sister     Stroke Maternal Grandmother 65    Other Daughter         BLADDER DISEASE       Social History:   Social History     Socioeconomic History    Marital status:    Tobacco Use    Smoking status: Former     Current packs/day: 0.00     Average packs/day: 1 pack/day for 4.0 years (4.0 ttl pk-yrs)     Types: Cigarettes     Start date:      Quit date:      Years since quittin.5    Smokeless tobacco: Never   Vaping Use    Vaping status: Never Used   Substance and Sexual Activity    Alcohol use: Never    Drug use: Never    Sexual activity: Defer       Medications:     Current Outpatient Medications:     amLODIPine (NORVASC) 5 MG  "tablet, Take 1 tablet by mouth Daily., Disp: 90 tablet, Rfl: 1    cholecalciferol (VITAMIN D3) 10 MCG (400 UNIT) tablet, TAKE 1 TABLET BY MOUTH DAILY, Disp: 90 tablet, Rfl: 0    LORazepam (ATIVAN) 1 MG tablet, Take 1 tablet by mouth Every Night., Disp: 30 tablet, Rfl: 2    simvastatin (ZOCOR) 20 MG tablet, Take 1 tablet by mouth Every Night., Disp: 90 tablet, Rfl: 1    traZODone (DESYREL) 50 MG tablet, Take 1 tablet by mouth every night at bedtime., Disp: 90 tablet, Rfl: 1    Allergies:   No Known Allergies      Physical Exam:  Vital Signs:   Vitals:    07/22/25 1307   BP: 118/72   BP Location: Left arm   Patient Position: Sitting   Cuff Size: Adult   Pulse: 76   Resp: 16   SpO2: 98%   Weight: 67.4 kg (148 lb 11.2 oz)   Height: 157.3 cm (61.93\")   PainSc: 0-No pain     Body mass index is 27.26 kg/m².     Physical Exam  Vitals and nursing note reviewed.   Constitutional:       Appearance: Normal appearance. She is normal weight.   HENT:      Head: Normocephalic and atraumatic.      Right Ear: Tympanic membrane, ear canal and external ear normal.      Left Ear: Tympanic membrane, ear canal and external ear normal.      Nose: Nose normal.      Mouth/Throat:      Mouth: Mucous membranes are dry.      Pharynx: Oropharynx is clear.   Eyes:      Extraocular Movements: Extraocular movements intact.      Conjunctiva/sclera: Conjunctivae normal.      Pupils: Pupils are equal, round, and reactive to light.   Cardiovascular:      Rate and Rhythm: Normal rate and regular rhythm.      Pulses: Normal pulses.      Heart sounds: Normal heart sounds.   Pulmonary:      Effort: Pulmonary effort is normal.      Breath sounds: Normal breath sounds.   Musculoskeletal:      Cervical back: Normal range of motion and neck supple.   Feet:      Comments:      Neurological:      Mental Status: She is alert.         Procedures      Assessment/Plan:   Diagnoses and all orders for this visit:    1. Hypertension, essential (Primary)  Assessment & " Plan:  Discussed with patient to monitor their blood pressure and if systolic blood pressure goes above 140 or diastolic is above 90 to return to clinic.  Take medicines as directed, call for any problems, patient not having or any worrisome symptoms.        Orders:  -     LORazepam (ATIVAN) 1 MG tablet; Take 1 tablet by mouth Every Night.  Dispense: 30 tablet; Refill: 2  -     Hemoglobin A1c; Future  -     Lipid Panel; Future  -     Comprehensive Metabolic Panel; Future  -     Vitamin B12; Future  -     Vitamin D,25-Hydroxy; Future  -     TSH Rfx On Abnormal To Free T4; Future  -     CBC & Differential; Future    2. Anxiety  Assessment & Plan:  Refill Ativan.  House bill 1 patient is aware long-term use of this can lead to memory loss    Orders:  -     LORazepam (ATIVAN) 1 MG tablet; Take 1 tablet by mouth Every Night.  Dispense: 30 tablet; Refill: 2  -     Hemoglobin A1c; Future  -     Lipid Panel; Future  -     Comprehensive Metabolic Panel; Future  -     Vitamin B12; Future  -     Vitamin D,25-Hydroxy; Future  -     TSH Rfx On Abnormal To Free T4; Future  -     CBC & Differential; Future    3. Hyperlipidemia, mixed  -     LORazepam (ATIVAN) 1 MG tablet; Take 1 tablet by mouth Every Night.  Dispense: 30 tablet; Refill: 2  -     Hemoglobin A1c; Future  -     Lipid Panel; Future  -     Comprehensive Metabolic Panel; Future  -     Vitamin B12; Future  -     Vitamin D,25-Hydroxy; Future  -     TSH Rfx On Abnormal To Free T4; Future  -     CBC & Differential; Future    4. Hyperglycemia  Assessment & Plan:  Blood work in 3 months    Orders:  -     LORazepam (ATIVAN) 1 MG tablet; Take 1 tablet by mouth Every Night.  Dispense: 30 tablet; Refill: 2  -     Hemoglobin A1c; Future  -     Lipid Panel; Future  -     Comprehensive Metabolic Panel; Future  -     Vitamin B12; Future  -     Vitamin D,25-Hydroxy; Future  -     TSH Rfx On Abnormal To Free T4; Future  -     CBC & Differential; Future    5. Vitamin D  deficiency  Assessment & Plan:  Blood work 3 months    Orders:  -     LORazepam (ATIVAN) 1 MG tablet; Take 1 tablet by mouth Every Night.  Dispense: 30 tablet; Refill: 2  -     Hemoglobin A1c; Future  -     Lipid Panel; Future  -     Comprehensive Metabolic Panel; Future  -     Vitamin B12; Future  -     Vitamin D,25-Hydroxy; Future  -     TSH Rfx On Abnormal To Free T4; Future  -     CBC & Differential; Future    6. Hypercalcemia  -     LORazepam (ATIVAN) 1 MG tablet; Take 1 tablet by mouth Every Night.  Dispense: 30 tablet; Refill: 2  -     Hemoglobin A1c; Future  -     Lipid Panel; Future  -     Comprehensive Metabolic Panel; Future  -     Vitamin B12; Future  -     Vitamin D,25-Hydroxy; Future  -     TSH Rfx On Abnormal To Free T4; Future  -     CBC & Differential; Future    7. Primary insomnia  -     LORazepam (ATIVAN) 1 MG tablet; Take 1 tablet by mouth Every Night.  Dispense: 30 tablet; Refill: 2  -     Hemoglobin A1c; Future  -     Lipid Panel; Future  -     Comprehensive Metabolic Panel; Future  -     Vitamin B12; Future  -     Vitamin D,25-Hydroxy; Future  -     TSH Rfx On Abnormal To Free T4; Future  -     CBC & Differential; Future             Follow Up:   Return in about 4 months (around 11/22/2025) for Bloodwork 1 week prior to next appointment, Recheck.      Janak Leiva MD  [facility] – [facility]

## 2025-07-22 ENCOUNTER — OFFICE VISIT (OUTPATIENT)
Dept: FAMILY MEDICINE CLINIC | Facility: CLINIC | Age: 65
End: 2025-07-22
Payer: MEDICARE

## 2025-07-22 VITALS
SYSTOLIC BLOOD PRESSURE: 118 MMHG | WEIGHT: 148.7 LBS | DIASTOLIC BLOOD PRESSURE: 72 MMHG | RESPIRATION RATE: 16 BRPM | HEART RATE: 76 BPM | BODY MASS INDEX: 27.36 KG/M2 | OXYGEN SATURATION: 98 % | HEIGHT: 62 IN

## 2025-07-22 DIAGNOSIS — F51.01 PRIMARY INSOMNIA: ICD-10-CM

## 2025-07-22 DIAGNOSIS — E55.9 VITAMIN D DEFICIENCY: ICD-10-CM

## 2025-07-22 DIAGNOSIS — F41.9 ANXIETY: ICD-10-CM

## 2025-07-22 DIAGNOSIS — Z79.899 ENCOUNTER FOR LONG-TERM (CURRENT) USE OF MEDICATIONS: ICD-10-CM

## 2025-07-22 DIAGNOSIS — I10 HYPERTENSION, ESSENTIAL: Primary | ICD-10-CM

## 2025-07-22 DIAGNOSIS — R73.9 HYPERGLYCEMIA: ICD-10-CM

## 2025-07-22 DIAGNOSIS — E83.52 HYPERCALCEMIA: ICD-10-CM

## 2025-07-22 DIAGNOSIS — E78.2 HYPERLIPIDEMIA, MIXED: ICD-10-CM

## 2025-07-22 LAB
POC AMPHETAMINES: NEGATIVE
POC BARBITURATES: NEGATIVE
POC BENZODIAZEPHINES: NEGATIVE
POC BUPRENORPHINE: NEGATIVE
POC COCAINE: NEGATIVE
POC METHADONE: NEGATIVE
POC METHAMPHETAMINE SCREEN URINE: NEGATIVE
POC OPIATES: NEGATIVE
POC OXYCODONE: NEGATIVE
POC PHENCYCLIDINE: NEGATIVE
POC PROPOXYPHENE: NEGATIVE
POC THC: NEGATIVE
POC TRICYCLIC ANTIDEPRESSANTS: NEGATIVE

## 2025-07-22 PROCEDURE — 1126F AMNT PAIN NOTED NONE PRSNT: CPT | Performed by: FAMILY MEDICINE

## 2025-07-22 PROCEDURE — 3078F DIAST BP <80 MM HG: CPT | Performed by: FAMILY MEDICINE

## 2025-07-22 PROCEDURE — 3074F SYST BP LT 130 MM HG: CPT | Performed by: FAMILY MEDICINE

## 2025-07-22 PROCEDURE — 99214 OFFICE O/P EST MOD 30 MIN: CPT | Performed by: FAMILY MEDICINE

## 2025-07-22 PROCEDURE — G2211 COMPLEX E/M VISIT ADD ON: HCPCS | Performed by: FAMILY MEDICINE

## 2025-07-22 RX ORDER — LORAZEPAM 1 MG/1
1 TABLET ORAL NIGHTLY
Qty: 30 TABLET | Refills: 2 | Status: SHIPPED | OUTPATIENT
Start: 2025-07-22

## 2025-07-24 DIAGNOSIS — E78.2 HYPERLIPIDEMIA, MIXED: ICD-10-CM

## 2025-07-24 RX ORDER — SIMVASTATIN 20 MG
20 TABLET ORAL NIGHTLY
Qty: 90 TABLET | Refills: 1 | OUTPATIENT
Start: 2025-07-24